# Patient Record
Sex: MALE | Race: WHITE | NOT HISPANIC OR LATINO
[De-identification: names, ages, dates, MRNs, and addresses within clinical notes are randomized per-mention and may not be internally consistent; named-entity substitution may affect disease eponyms.]

---

## 2020-09-25 ENCOUNTER — APPOINTMENT (OUTPATIENT)
Dept: DISASTER EMERGENCY | Facility: CLINIC | Age: 75
End: 2020-09-25

## 2020-09-25 DIAGNOSIS — Z01.818 ENCOUNTER FOR OTHER PREPROCEDURAL EXAMINATION: ICD-10-CM

## 2020-09-25 PROBLEM — Z00.00 ENCOUNTER FOR PREVENTIVE HEALTH EXAMINATION: Status: ACTIVE | Noted: 2020-09-25

## 2020-09-26 LAB — SARS-COV-2 N GENE NPH QL NAA+PROBE: NOT DETECTED

## 2021-01-25 ENCOUNTER — FORM ENCOUNTER (OUTPATIENT)
Age: 76
End: 2021-01-25

## 2021-01-26 ENCOUNTER — TRANSCRIPTION ENCOUNTER (OUTPATIENT)
Age: 76
End: 2021-01-26

## 2021-01-29 ENCOUNTER — OUTPATIENT (OUTPATIENT)
Dept: INPATIENT UNIT | Facility: HOSPITAL | Age: 76
LOS: 1 days | End: 2021-01-29
Payer: MEDICARE

## 2021-01-29 ENCOUNTER — APPOINTMENT (OUTPATIENT)
Dept: DISASTER EMERGENCY | Facility: HOSPITAL | Age: 76
End: 2021-01-29

## 2021-01-29 VITALS
WEIGHT: 169.98 LBS | RESPIRATION RATE: 18 BRPM | SYSTOLIC BLOOD PRESSURE: 167 MMHG | HEART RATE: 89 BPM | TEMPERATURE: 99 F | HEIGHT: 68 IN | DIASTOLIC BLOOD PRESSURE: 100 MMHG | OXYGEN SATURATION: 99 %

## 2021-01-29 VITALS
OXYGEN SATURATION: 99 % | DIASTOLIC BLOOD PRESSURE: 95 MMHG | RESPIRATION RATE: 18 BRPM | HEART RATE: 90 BPM | SYSTOLIC BLOOD PRESSURE: 152 MMHG | TEMPERATURE: 99 F

## 2021-01-29 DIAGNOSIS — U07.1 COVID-19: ICD-10-CM

## 2021-01-29 PROCEDURE — M0239: CPT

## 2021-01-29 RX ORDER — SODIUM CHLORIDE 9 MG/ML
250 INJECTION INTRAMUSCULAR; INTRAVENOUS; SUBCUTANEOUS
Refills: 0 | Status: DISCONTINUED | OUTPATIENT
Start: 2021-01-29 | End: 2021-02-13

## 2021-01-29 RX ORDER — BAMLANIVIMAB 35 MG/ML
700 INJECTION, SOLUTION INTRAVENOUS ONCE
Refills: 0 | Status: COMPLETED | OUTPATIENT
Start: 2021-01-29 | End: 2021-01-29

## 2021-01-29 RX ADMIN — BAMLANIVIMAB 270 MILLIGRAM(S): 35 INJECTION, SOLUTION INTRAVENOUS at 14:18

## 2021-01-29 RX ADMIN — SODIUM CHLORIDE 25 MILLILITER(S): 9 INJECTION INTRAMUSCULAR; INTRAVENOUS; SUBCUTANEOUS at 15:30

## 2021-01-29 NOTE — CHART NOTE - NSCHARTNOTEFT_GEN_A_CORE
CC: Monoclonal Antibody Infusion/COVID 19 Positive  75yMale with pmhx of CAD s/p CABG x 3, HTN, DM2, BPH, HLD, [place], presents today for monoclonal antibody infusion. Patient endorses onset of symptoms 1/26 consisting of chills & malaise, tested + for COVID the same day, and referred by PCP-Dr. Stauffer for infusion.     exam/findings:  T(C): 37.3 (01-29-21 @ 14:35), Max: 37.3 (01-29-21 @ 14:35)  HR: 83 (01-29-21 @ 14:35) (82 - 89)  BP: 151/88 (01-29-21 @ 14:35) (148/100 - 167/100)  RR: 16 (01-29-21 @ 14:35) (16 - 18)  SpO2: 97% (01-29-21 @ 14:35) (97% - 99%)      PE:   Appearance: NAD		  Skin: warm and dry  Neurologic: Non-focal  Extremities: Normal range of motion,    ASSESSMENT:  Pt is a 75yMale with pmhx of CAD s/p CABG x 3, HTN, DM2, BPH, HLD, [place], tested + for COVID the same day, and referred by PCP-Dr. Stauffer to infusion center for Monoclonal antibody infusion (Bamlanivimab).  Symptoms/ Criteria: Chills & malaise  Risk Profile includes: Age>65, cardiovascular hx, hx of DM2    PLAN:  - Infusion procedure explained to patient   - Consent for monoclonal antibody infusion obtained   - Risk & benefits discussed/all questions answered  - Infuse Bamlanivimab 700mg IV over one hour   - Observe patient for one hour post infusion    I have reviewed the Bamlanivimab Emergency Use Authorization (EUA) and I have provided the patient or patient's caregiver with the following information:      1. FDA has authorized emergency use Bamlanivimab, which is not an FDA-approved biological product.  2. The patient or patient's caregiver has the option to accept or refuse administration of Bamlanivimab.   3. The significant known and potential risks and benefits of Bamlanivimab and the extent to which such risks and benefits are unknown.  4. Information on available alternative treatments and risks and benefits of those alternatives.

## 2021-01-30 ENCOUNTER — TRANSCRIPTION ENCOUNTER (OUTPATIENT)
Age: 76
End: 2021-01-30

## 2021-03-06 ENCOUNTER — TRANSCRIPTION ENCOUNTER (OUTPATIENT)
Age: 76
End: 2021-03-06

## 2021-07-20 ENCOUNTER — OUTPATIENT (OUTPATIENT)
Dept: OUTPATIENT SERVICES | Facility: HOSPITAL | Age: 76
LOS: 1 days | Discharge: HOME | End: 2021-07-20
Payer: MEDICARE

## 2021-07-20 VITALS
OXYGEN SATURATION: 100 % | DIASTOLIC BLOOD PRESSURE: 95 MMHG | SYSTOLIC BLOOD PRESSURE: 155 MMHG | WEIGHT: 189.6 LBS | HEART RATE: 80 BPM | RESPIRATION RATE: 16 BRPM | HEIGHT: 68 IN | TEMPERATURE: 98 F

## 2021-07-20 DIAGNOSIS — Z98.890 OTHER SPECIFIED POSTPROCEDURAL STATES: Chronic | ICD-10-CM

## 2021-07-20 DIAGNOSIS — M62.830 MUSCLE SPASM OF BACK: ICD-10-CM

## 2021-07-20 DIAGNOSIS — Z95.1 PRESENCE OF AORTOCORONARY BYPASS GRAFT: Chronic | ICD-10-CM

## 2021-07-20 DIAGNOSIS — Z01.818 ENCOUNTER FOR OTHER PREPROCEDURAL EXAMINATION: ICD-10-CM

## 2021-07-20 LAB
A1C WITH ESTIMATED AVERAGE GLUCOSE RESULT: 7.2 % — HIGH (ref 4–5.6)
ANION GAP SERPL CALC-SCNC: 4 MMOL/L — LOW (ref 5–17)
APTT BLD: 29.6 SEC — SIGNIFICANT CHANGE UP (ref 27.5–35.5)
BLD GP AB SCN SERPL QL: SIGNIFICANT CHANGE UP
BUN SERPL-MCNC: 47 MG/DL — HIGH (ref 7–23)
CALCIUM SERPL-MCNC: 9.9 MG/DL — SIGNIFICANT CHANGE UP (ref 8.5–10.1)
CHLORIDE SERPL-SCNC: 109 MMOL/L — HIGH (ref 96–108)
CO2 SERPL-SCNC: 29 MMOL/L — SIGNIFICANT CHANGE UP (ref 22–31)
CREAT SERPL-MCNC: 1.76 MG/DL — HIGH (ref 0.5–1.3)
ESTIMATED AVERAGE GLUCOSE: 160 MG/DL — HIGH (ref 68–114)
GLUCOSE SERPL-MCNC: 135 MG/DL — HIGH (ref 70–99)
HCT VFR BLD CALC: 39.9 % — SIGNIFICANT CHANGE UP (ref 39–50)
HGB BLD-MCNC: 12.5 G/DL — LOW (ref 13–17)
INR BLD: 0.87 RATIO — LOW (ref 0.88–1.16)
MCHC RBC-ENTMCNC: 26.9 PG — LOW (ref 27–34)
MCHC RBC-ENTMCNC: 31.3 GM/DL — LOW (ref 32–36)
MCV RBC AUTO: 85.8 FL — SIGNIFICANT CHANGE UP (ref 80–100)
NRBC # BLD: 0 /100 WBCS — SIGNIFICANT CHANGE UP (ref 0–0)
PLATELET # BLD AUTO: 226 K/UL — SIGNIFICANT CHANGE UP (ref 150–400)
POTASSIUM SERPL-MCNC: 5 MMOL/L — SIGNIFICANT CHANGE UP (ref 3.5–5.3)
POTASSIUM SERPL-SCNC: 5 MMOL/L — SIGNIFICANT CHANGE UP (ref 3.5–5.3)
PROTHROM AB SERPL-ACNC: 10.2 SEC — LOW (ref 10.6–13.6)
RBC # BLD: 4.65 M/UL — SIGNIFICANT CHANGE UP (ref 4.2–5.8)
RBC # FLD: 15.1 % — HIGH (ref 10.3–14.5)
SODIUM SERPL-SCNC: 142 MMOL/L — SIGNIFICANT CHANGE UP (ref 135–145)
WBC # BLD: 7.52 K/UL — SIGNIFICANT CHANGE UP (ref 3.8–10.5)
WBC # FLD AUTO: 7.52 K/UL — SIGNIFICANT CHANGE UP (ref 3.8–10.5)

## 2021-07-20 PROCEDURE — 93010 ELECTROCARDIOGRAM REPORT: CPT

## 2021-07-20 NOTE — H&P PST ADULT - NSANTHOSAYNRD_GEN_A_CORE
No. JEANCARLOS screening performed.  STOP BANG Legend: 0-2 = LOW Risk; 3-4 = INTERMEDIATE Risk; 5-8 = HIGH Risk

## 2021-07-20 NOTE — H&P PST ADULT - NSICDXPASTSURGICALHX_GEN_ALL_CORE_FT
PAST SURGICAL HISTORY:  H/O elbow surgery right    H/O hernia repair x2    History of surgery on arm right humerous    History of transurethral prostatectomy     S/P CABG x 3 2015

## 2021-07-20 NOTE — H&P PST ADULT - ASSESSMENT
76 year old male with a past medical history of HTN, CABGx3, DM, HLD, GERD c/o lower back pain and b/l buttock pain that radiates down left and right leg (right leg daily, left intermittent)  with intermittent numbness and tingling to right foot.  He is scheduled for a decompression laminectomy L4-L5 on 2021.    CAPRINI SCORE [CLOT]    AGE RELATED RISK FACTORS                                                       MOBILITY RELATED FACTORS  [ ] Age 41-60 years                                            (1 Point)                  [ ] Bed rest                                                        (1 Point)  [ ] Age: 61-74 years                                           (2 Points)                 [ ] Plaster cast                                                   (2 Points)  [x ] Age= 75 years                                              (3 Points)                 [ ] Bed bound for more than 72 hours                 (2 Points)    DISEASE RELATED RISK FACTORS                                               GENDER SPECIFIC FACTORS  [ ] Edema in the lower extremities                       (1 Point)                  [ ] Pregnancy                                                     (1 Point)  [ ] Varicose veins                                               (1 Point)                  [ ] Post-partum < 6 weeks                                   (1 Point)             [ x BMI > 25 Kg/m2                                            (1 Point)                  [ ] Hormonal therapy  or oral contraception          (1 Point)                 [ ] Sepsis (in the previous month)                        (1 Point)                  [ ] History of pregnancy complications                 (1 point)  [ ] Pneumonia or serious lung disease                                               [ ] Unexplained or recurrent                     (1 Point)           (in the previous month)                               (1 Point)  [ ] Abnormal pulmonary function test                     (1 Point)                 SURGERY RELATED RISK FACTORS  [ ] Acute myocardial infarction                              (1 Point)                 [ ]  Section                                             (1 Point)  [ ] Congestive heart failure (in the previous month)  (1 Point)               [ ] Minor surgery                                                  (1 Point)   [ ] Inflammatory bowel disease                             (1 Point)                 [ ] Arthroscopic surgery                                        (2 Points)  [ ] Central venous access                                      (2 Points)                [x ] General surgery lasting more than 45 minutes   (2 Points)       [ ] Stroke (in the previous month)                          (5 Points)               [ ] Elective arthroplasty                                         (5 Points)                                                                                                                                               HEMATOLOGY RELATED FACTORS                                                 TRAUMA RELATED RISK FACTORS  [ ] Prior episodes of VTE                                     (3 Points)                [ ] Fracture of the hip, pelvis, or leg                       (5 Points)  [ ] Positive family history for VTE                         (3 Points)                 [ ] Acute spinal cord injury (in the previous month)  (5 Points)  [ ] Prothrombin 38734 A                                     (3 Points)                 [ ] Paralysis  (less than 1 month)                             (5 Points)  [ ] Factor V Leiden                                             (3 Points)                  [ ] Multiple Trauma within 1 month                        (5 Points)  [ ] Lupus anticoagulants                                     (3 Points)                                                           [ ] Anticardiolipin antibodies                               (3 Points)                                                       [ ] High homocysteine in the blood                      (3 Points)                                             [ ] Other congenital or acquired thrombophilia      (3 Points)                                                [ ] Heparin induced thrombocytopenia                  (3 Points)                                          Total Score [  6        ]    Caprini Score 0 - 2:  Low Risk, No VTE Prophylaxis required for most patients, encourage ambulation  Caprini Score 3 - 6:  At Risk, pharmacologic VTE prophylaxis is indicated for most patients (in the absence of a contraindication)  Caprini Score Greater than or = 7:  High Risk, pharmacologic VTE prophylaxis is indicated for most patients (in the absence of a contraindication)    Caprini score indicates that the patient is high risk for VTE event ( score 6 or greater). Surgical patient's in this group will benefit from both pharmacologic prophylaxis and intermittent compression devices . Surgical team will determine the balance between VTE  risk and bleeding risk and other clinical considerations

## 2021-07-20 NOTE — H&P PST ADULT - ATTENDING COMMENTS
indicated for lumbar decompression - r/b/e questions answered - well informed and would like to proceed

## 2021-07-20 NOTE — H&P PST ADULT - HISTORY OF PRESENT ILLNESS
76 year old male with a past medical history of HTN, CABGx3, DM, HLD, GERD c/o lower back pain and  b/l buttock pain that radiates down left and right leg (right leg daily, left intermittent)  with intermittent numbness and tingling to right foot.  He is scheduled for a decompression laminectomy L4-L5 on 7/27/2021.    He denies fever, cough, SOB, recent travels, and sick contacts.

## 2021-07-20 NOTE — H&P PST ADULT - NSICDXPROBLEM_GEN_ALL_CORE_FT
PROBLEM DIAGNOSES  Problem: Muscle spasm of back  Assessment and Plan: Decompression laminectomy L4-L5 with image

## 2021-07-20 NOTE — H&P PST ADULT - NSICDXPASTMEDICALHX_GEN_ALL_CORE_FT
PAST MEDICAL HISTORY:  DM (diabetes mellitus)     GERD (gastroesophageal reflux disease)     HLD (hyperlipidemia)     HTN (hypertension)     S/P CABG x 3 2015

## 2021-07-26 ENCOUNTER — TRANSCRIPTION ENCOUNTER (OUTPATIENT)
Age: 76
End: 2021-07-26

## 2021-07-27 ENCOUNTER — TRANSCRIPTION ENCOUNTER (OUTPATIENT)
Age: 76
End: 2021-07-27

## 2021-07-27 ENCOUNTER — INPATIENT (INPATIENT)
Facility: HOSPITAL | Age: 76
LOS: 5 days | Discharge: NURSING, REHAB OR LONG-TERM CARE | End: 2021-08-02
Attending: ORTHOPAEDIC SURGERY | Admitting: ORTHOPAEDIC SURGERY
Payer: MEDICARE

## 2021-07-27 VITALS
WEIGHT: 184.97 LBS | TEMPERATURE: 99 F | DIASTOLIC BLOOD PRESSURE: 74 MMHG | OXYGEN SATURATION: 100 % | SYSTOLIC BLOOD PRESSURE: 145 MMHG | HEART RATE: 55 BPM | HEIGHT: 68 IN | RESPIRATION RATE: 17 BRPM

## 2021-07-27 DIAGNOSIS — Z98.890 OTHER SPECIFIED POSTPROCEDURAL STATES: Chronic | ICD-10-CM

## 2021-07-27 DIAGNOSIS — Z95.1 PRESENCE OF AORTOCORONARY BYPASS GRAFT: Chronic | ICD-10-CM

## 2021-07-27 LAB
ANION GAP SERPL CALC-SCNC: 10 MMOL/L — SIGNIFICANT CHANGE UP (ref 5–17)
BASOPHILS # BLD AUTO: 0.02 K/UL — SIGNIFICANT CHANGE UP (ref 0–0.2)
BASOPHILS NFR BLD AUTO: 0.3 % — SIGNIFICANT CHANGE UP (ref 0–2)
BLD GP AB SCN SERPL QL: SIGNIFICANT CHANGE UP
BUN SERPL-MCNC: 43 MG/DL — HIGH (ref 7–23)
CALCIUM SERPL-MCNC: 8.5 MG/DL — SIGNIFICANT CHANGE UP (ref 8.5–10.1)
CHLORIDE SERPL-SCNC: 110 MMOL/L — HIGH (ref 96–108)
CO2 SERPL-SCNC: 22 MMOL/L — SIGNIFICANT CHANGE UP (ref 22–31)
CREAT SERPL-MCNC: 1.94 MG/DL — HIGH (ref 0.5–1.3)
EOSINOPHIL # BLD AUTO: 0.16 K/UL — SIGNIFICANT CHANGE UP (ref 0–0.5)
EOSINOPHIL NFR BLD AUTO: 2.4 % — SIGNIFICANT CHANGE UP (ref 0–6)
GLUCOSE BLDC GLUCOMTR-MCNC: 129 MG/DL — HIGH (ref 70–99)
GLUCOSE BLDC GLUCOMTR-MCNC: 144 MG/DL — HIGH (ref 70–99)
GLUCOSE BLDC GLUCOMTR-MCNC: 172 MG/DL — HIGH (ref 70–99)
GLUCOSE BLDC GLUCOMTR-MCNC: 183 MG/DL — HIGH (ref 70–99)
GLUCOSE BLDC GLUCOMTR-MCNC: 185 MG/DL — HIGH (ref 70–99)
GLUCOSE SERPL-MCNC: 184 MG/DL — HIGH (ref 70–99)
HCT VFR BLD CALC: 33.9 % — LOW (ref 39–50)
HGB BLD-MCNC: 10.9 G/DL — LOW (ref 13–17)
IMM GRANULOCYTES NFR BLD AUTO: 0.3 % — SIGNIFICANT CHANGE UP (ref 0–1.5)
LYMPHOCYTES # BLD AUTO: 2.34 K/UL — SIGNIFICANT CHANGE UP (ref 1–3.3)
LYMPHOCYTES # BLD AUTO: 35.3 % — SIGNIFICANT CHANGE UP (ref 13–44)
MCHC RBC-ENTMCNC: 27.4 PG — SIGNIFICANT CHANGE UP (ref 27–34)
MCHC RBC-ENTMCNC: 32.2 GM/DL — SIGNIFICANT CHANGE UP (ref 32–36)
MCV RBC AUTO: 85.2 FL — SIGNIFICANT CHANGE UP (ref 80–100)
MONOCYTES # BLD AUTO: 0.6 K/UL — SIGNIFICANT CHANGE UP (ref 0–0.9)
MONOCYTES NFR BLD AUTO: 9.1 % — SIGNIFICANT CHANGE UP (ref 2–14)
NEUTROPHILS # BLD AUTO: 3.48 K/UL — SIGNIFICANT CHANGE UP (ref 1.8–7.4)
NEUTROPHILS NFR BLD AUTO: 52.6 % — SIGNIFICANT CHANGE UP (ref 43–77)
NRBC # BLD: 0 /100 WBCS — SIGNIFICANT CHANGE UP (ref 0–0)
PLATELET # BLD AUTO: 152 K/UL — SIGNIFICANT CHANGE UP (ref 150–400)
POTASSIUM SERPL-MCNC: 4.5 MMOL/L — SIGNIFICANT CHANGE UP (ref 3.5–5.3)
POTASSIUM SERPL-SCNC: 4.5 MMOL/L — SIGNIFICANT CHANGE UP (ref 3.5–5.3)
RBC # BLD: 3.98 M/UL — LOW (ref 4.2–5.8)
RBC # FLD: 14.7 % — HIGH (ref 10.3–14.5)
SODIUM SERPL-SCNC: 142 MMOL/L — SIGNIFICANT CHANGE UP (ref 135–145)
WBC # BLD: 6.62 K/UL — SIGNIFICANT CHANGE UP (ref 3.8–10.5)
WBC # FLD AUTO: 6.62 K/UL — SIGNIFICANT CHANGE UP (ref 3.8–10.5)

## 2021-07-27 RX ORDER — SODIUM CHLORIDE 9 MG/ML
1000 INJECTION, SOLUTION INTRAVENOUS
Refills: 0 | Status: DISCONTINUED | OUTPATIENT
Start: 2021-07-27 | End: 2021-07-27

## 2021-07-27 RX ORDER — DEXTROSE 50 % IN WATER 50 %
25 SYRINGE (ML) INTRAVENOUS ONCE
Refills: 0 | Status: DISCONTINUED | OUTPATIENT
Start: 2021-07-27 | End: 2021-08-02

## 2021-07-27 RX ORDER — DEXTROSE 50 % IN WATER 50 %
15 SYRINGE (ML) INTRAVENOUS ONCE
Refills: 0 | Status: DISCONTINUED | OUTPATIENT
Start: 2021-07-27 | End: 2021-08-02

## 2021-07-27 RX ORDER — ACETAMINOPHEN 500 MG
975 TABLET ORAL EVERY 8 HOURS
Refills: 0 | Status: DISCONTINUED | OUTPATIENT
Start: 2021-07-27 | End: 2021-08-02

## 2021-07-27 RX ORDER — GLUCAGON INJECTION, SOLUTION 0.5 MG/.1ML
1 INJECTION, SOLUTION SUBCUTANEOUS ONCE
Refills: 0 | Status: DISCONTINUED | OUTPATIENT
Start: 2021-07-27 | End: 2021-08-02

## 2021-07-27 RX ORDER — LISINOPRIL 2.5 MG/1
10 TABLET ORAL DAILY
Refills: 0 | Status: DISCONTINUED | OUTPATIENT
Start: 2021-07-27 | End: 2021-08-02

## 2021-07-27 RX ORDER — CEFAZOLIN SODIUM 1 G
2000 VIAL (EA) INJECTION EVERY 8 HOURS
Refills: 0 | Status: COMPLETED | OUTPATIENT
Start: 2021-07-27 | End: 2021-07-28

## 2021-07-27 RX ORDER — ONDANSETRON 8 MG/1
4 TABLET, FILM COATED ORAL ONCE
Refills: 0 | Status: DISCONTINUED | OUTPATIENT
Start: 2021-07-27 | End: 2021-07-27

## 2021-07-27 RX ORDER — QUINAPRIL HYDROCHLORIDE 40 MG/1
0 TABLET, FILM COATED ORAL
Qty: 0 | Refills: 0 | DISCHARGE

## 2021-07-27 RX ORDER — DEXTROSE 50 % IN WATER 50 %
12.5 SYRINGE (ML) INTRAVENOUS ONCE
Refills: 0 | Status: DISCONTINUED | OUTPATIENT
Start: 2021-07-27 | End: 2021-08-02

## 2021-07-27 RX ORDER — ASPIRIN/CALCIUM CARB/MAGNESIUM 324 MG
81 TABLET ORAL DAILY
Refills: 0 | Status: DISCONTINUED | OUTPATIENT
Start: 2021-07-29 | End: 2021-08-02

## 2021-07-27 RX ORDER — SENNA PLUS 8.6 MG/1
2 TABLET ORAL AT BEDTIME
Refills: 0 | Status: DISCONTINUED | OUTPATIENT
Start: 2021-07-27 | End: 2021-08-02

## 2021-07-27 RX ORDER — SODIUM CHLORIDE 9 MG/ML
500 INJECTION INTRAMUSCULAR; INTRAVENOUS; SUBCUTANEOUS ONCE
Refills: 0 | Status: COMPLETED | OUTPATIENT
Start: 2021-07-27 | End: 2021-07-27

## 2021-07-27 RX ORDER — METOCLOPRAMIDE HCL 10 MG
10 TABLET ORAL ONCE
Refills: 0 | Status: DISCONTINUED | OUTPATIENT
Start: 2021-07-27 | End: 2021-07-29

## 2021-07-27 RX ORDER — INSULIN LISPRO 100/ML
VIAL (ML) SUBCUTANEOUS
Refills: 0 | Status: DISCONTINUED | OUTPATIENT
Start: 2021-07-27 | End: 2021-08-02

## 2021-07-27 RX ORDER — SODIUM CHLORIDE 9 MG/ML
1000 INJECTION, SOLUTION INTRAVENOUS
Refills: 0 | Status: DISCONTINUED | OUTPATIENT
Start: 2021-07-27 | End: 2021-08-02

## 2021-07-27 RX ORDER — PANTOPRAZOLE SODIUM 20 MG/1
0 TABLET, DELAYED RELEASE ORAL
Qty: 0 | Refills: 0 | DISCHARGE

## 2021-07-27 RX ORDER — SODIUM CHLORIDE 9 MG/ML
3 INJECTION INTRAMUSCULAR; INTRAVENOUS; SUBCUTANEOUS EVERY 8 HOURS
Refills: 0 | Status: DISCONTINUED | OUTPATIENT
Start: 2021-07-27 | End: 2021-07-27

## 2021-07-27 RX ORDER — CYCLOBENZAPRINE HYDROCHLORIDE 10 MG/1
10 TABLET, FILM COATED ORAL EVERY 8 HOURS
Refills: 0 | Status: DISCONTINUED | OUTPATIENT
Start: 2021-07-27 | End: 2021-07-28

## 2021-07-27 RX ORDER — METOPROLOL TARTRATE 50 MG
25 TABLET ORAL DAILY
Refills: 0 | Status: DISCONTINUED | OUTPATIENT
Start: 2021-07-27 | End: 2021-08-02

## 2021-07-27 RX ORDER — GLIMEPIRIDE 1 MG
1 TABLET ORAL
Qty: 0 | Refills: 0 | DISCHARGE

## 2021-07-27 RX ORDER — PANTOPRAZOLE SODIUM 20 MG/1
40 TABLET, DELAYED RELEASE ORAL
Refills: 0 | Status: DISCONTINUED | OUTPATIENT
Start: 2021-07-27 | End: 2021-07-31

## 2021-07-27 RX ORDER — SIMVASTATIN 20 MG/1
1 TABLET, FILM COATED ORAL
Qty: 0 | Refills: 0 | DISCHARGE

## 2021-07-27 RX ORDER — SIMVASTATIN 20 MG/1
40 TABLET, FILM COATED ORAL AT BEDTIME
Refills: 0 | Status: DISCONTINUED | OUTPATIENT
Start: 2021-07-27 | End: 2021-08-02

## 2021-07-27 RX ORDER — OXYCODONE HYDROCHLORIDE 5 MG/1
10 TABLET ORAL EVERY 4 HOURS
Refills: 0 | Status: DISCONTINUED | OUTPATIENT
Start: 2021-07-27 | End: 2021-08-02

## 2021-07-27 RX ORDER — SODIUM CHLORIDE 9 MG/ML
1000 INJECTION, SOLUTION INTRAVENOUS
Refills: 0 | Status: DISCONTINUED | OUTPATIENT
Start: 2021-07-27 | End: 2021-07-30

## 2021-07-27 RX ORDER — OXYCODONE HYDROCHLORIDE 5 MG/1
5 TABLET ORAL EVERY 4 HOURS
Refills: 0 | Status: DISCONTINUED | OUTPATIENT
Start: 2021-07-27 | End: 2021-08-02

## 2021-07-27 RX ORDER — FENTANYL CITRATE 50 UG/ML
25 INJECTION INTRAVENOUS
Refills: 0 | Status: DISCONTINUED | OUTPATIENT
Start: 2021-07-27 | End: 2021-07-27

## 2021-07-27 RX ORDER — METOPROLOL TARTRATE 50 MG
1 TABLET ORAL
Qty: 0 | Refills: 0 | DISCHARGE

## 2021-07-27 RX ORDER — HYDROMORPHONE HYDROCHLORIDE 2 MG/ML
0.5 INJECTION INTRAMUSCULAR; INTRAVENOUS; SUBCUTANEOUS
Refills: 0 | Status: DISCONTINUED | OUTPATIENT
Start: 2021-07-27 | End: 2021-08-02

## 2021-07-27 RX ORDER — DIPHENHYDRAMINE HCL 50 MG
12.5 CAPSULE ORAL EVERY 4 HOURS
Refills: 0 | Status: DISCONTINUED | OUTPATIENT
Start: 2021-07-27 | End: 2021-08-02

## 2021-07-27 RX ADMIN — Medication 975 MILLIGRAM(S): at 14:50

## 2021-07-27 RX ADMIN — SODIUM CHLORIDE 1000 MILLILITER(S): 9 INJECTION INTRAMUSCULAR; INTRAVENOUS; SUBCUTANEOUS at 16:16

## 2021-07-27 RX ADMIN — Medication 975 MILLIGRAM(S): at 22:35

## 2021-07-27 RX ADMIN — FENTANYL CITRATE 25 MICROGRAM(S): 50 INJECTION INTRAVENOUS at 12:21

## 2021-07-27 RX ADMIN — HYDROMORPHONE HYDROCHLORIDE 0.5 MILLIGRAM(S): 2 INJECTION INTRAMUSCULAR; INTRAVENOUS; SUBCUTANEOUS at 20:24

## 2021-07-27 RX ADMIN — SODIUM CHLORIDE 75 MILLILITER(S): 9 INJECTION, SOLUTION INTRAVENOUS at 13:11

## 2021-07-27 RX ADMIN — CYCLOBENZAPRINE HYDROCHLORIDE 10 MILLIGRAM(S): 10 TABLET, FILM COATED ORAL at 21:35

## 2021-07-27 RX ADMIN — Medication 975 MILLIGRAM(S): at 13:58

## 2021-07-27 RX ADMIN — OXYCODONE HYDROCHLORIDE 10 MILLIGRAM(S): 5 TABLET ORAL at 19:24

## 2021-07-27 RX ADMIN — SIMVASTATIN 40 MILLIGRAM(S): 20 TABLET, FILM COATED ORAL at 21:35

## 2021-07-27 RX ADMIN — OXYCODONE HYDROCHLORIDE 10 MILLIGRAM(S): 5 TABLET ORAL at 13:57

## 2021-07-27 RX ADMIN — Medication 975 MILLIGRAM(S): at 21:35

## 2021-07-27 RX ADMIN — Medication 100 MILLIGRAM(S): at 17:10

## 2021-07-27 RX ADMIN — OXYCODONE HYDROCHLORIDE 10 MILLIGRAM(S): 5 TABLET ORAL at 14:50

## 2021-07-27 RX ADMIN — CYCLOBENZAPRINE HYDROCHLORIDE 10 MILLIGRAM(S): 10 TABLET, FILM COATED ORAL at 13:58

## 2021-07-27 RX ADMIN — OXYCODONE HYDROCHLORIDE 10 MILLIGRAM(S): 5 TABLET ORAL at 19:39

## 2021-07-27 RX ADMIN — SODIUM CHLORIDE 75 MILLILITER(S): 9 INJECTION, SOLUTION INTRAVENOUS at 11:54

## 2021-07-27 RX ADMIN — HYDROMORPHONE HYDROCHLORIDE 0.5 MILLIGRAM(S): 2 INJECTION INTRAMUSCULAR; INTRAVENOUS; SUBCUTANEOUS at 20:25

## 2021-07-27 RX ADMIN — SENNA PLUS 2 TABLET(S): 8.6 TABLET ORAL at 21:35

## 2021-07-27 RX ADMIN — FENTANYL CITRATE 25 MICROGRAM(S): 50 INJECTION INTRAVENOUS at 12:06

## 2021-07-27 RX ADMIN — SODIUM CHLORIDE 75 MILLILITER(S): 9 INJECTION, SOLUTION INTRAVENOUS at 21:36

## 2021-07-27 NOTE — DISCHARGE NOTE PROVIDER - NSDCFUADDINST_GEN_ALL_CORE_FT
No bending/lifting/twisting/pulling/pushing/carrying or driving. No blood thinners (not limited to but including) aspirin, motrin, alleve, naproxen, etc.    Keep Dressing Clean, Dry and Intact. May shower on POD#5 with Dressing on. Dressing may be removed on POD#7. Please do not scrub, soak, peel or pick at the dressing. No creams, lotions, or oils over dressing. May shower on POD#5 and let water run over dressing, no baths. Pat dry once out of shower.     If dressing is saturated from border to border. Remove dressing and cover with clean dry dressing.  No straining or bending/lifting/twisting/pulling/pushing/carrying or driving. No blood thinners (not limited to but including) aspirin, motrin, alleve, naproxen, etc.    Keep Dressing Clean, Dry and Intact. May shower on POD#5 with Dressing on. Dressing may be removed on POD#7. Please do not scrub, soak, peel or pick at the dressing. No creams, lotions, or oils over dressing. May shower on POD#5 and let water run over dressing, no baths. Pat dry once out of shower.     If dressing is saturated from border to border. Remove dressing and cover with clean dry dressing.

## 2021-07-27 NOTE — DISCHARGE NOTE PROVIDER - CARE PROVIDER_API CALL
Anthony Chandler)  Orthopaedic Surgery  [address] [place] [address]  Phone: [phone]  Fax: [phone]  Follow Up Time:

## 2021-07-27 NOTE — ASU PATIENT PROFILE, ADULT - PSH
H/O elbow surgery  right  H/O hernia repair  x2  History of surgery on arm  right humerous  History of transurethral prostatectomy    S/P CABG x 3  2015

## 2021-07-27 NOTE — DISCHARGE NOTE PROVIDER - NSDCMRMEDTOKEN_GEN_ALL_CORE_FT
Aspirin Enteric Coated 81 mg oral delayed release tablet: 1 tab(s) orally once a day  CoQ10: 50 milligram(s) orally once a day  folic acid 0.4 mg oral tablet: 1 tab(s) orally once a day  glimepiride 4 mg oral tablet: 1  orally 2 times a day  Januvia 100 mg oral tablet: 1 tab(s) orally once a day  metFORMIN 1000 mg oral tablet: 1 tab(s) orally 2 times a day  Metoprolol Succinate ER 25 mg oral tablet, extended release: 1 tab(s) orally once a day  Omega-3 1000 mg oral capsule: 1 cap(s) orally 2 times a day  pantoprazole 40 mg oral delayed release tablet: orally every other day  quinapril 10 mg oral tablet: orally 2 times a day  simvastatin 40 mg oral tablet: 1 tab(s) orally once a day (at bedtime)   acetaminophen 325 mg oral tablet: 3 tab(s) orally every 8 hours  aspirin 81 mg oral delayed release tablet: 1 tab(s) orally once a day  glimepiride 4 mg oral tablet: 1  orally 2 times a day  Metoprolol Succinate ER 25 mg oral tablet, extended release: 1 tab(s) orally once a day  oxyCODONE 10 mg oral tablet: 1 tab(s) orally every 4 hours, As needed, Severe Pain (7 - 10)  oxyCODONE 5 mg oral tablet: 1 tab(s) orally every 4 hours, As needed, Moderate Pain (4 - 6)  pantoprazole 40 mg oral delayed release tablet: orally every other day  quinapril 10 mg oral tablet: orally 2 times a day  senna oral tablet: 2 tab(s) orally once a day (at bedtime)  simvastatin 40 mg oral tablet: 1 tab(s) orally once a day (at bedtime)

## 2021-07-27 NOTE — PROGRESS NOTE ADULT - SUBJECTIVE AND OBJECTIVE BOX
76yMale s/p Laminectomy/discectomy POD #0  Pt seen and examined in NAD.   Pain controlled.  Pt denies any new complaints.   Pt denies CP/SOB/N/V/D/numbness/tingling/bowel or bladder dysfunction.     Vital Signs Last 24 Hrs  T(C): 36.4 (27 Jul 2021 13:00), Max: 37 (27 Jul 2021 07:28)  T(F): 97.5 (27 Jul 2021 13:00), Max: 98.6 (27 Jul 2021 07:28)  HR: 61 (27 Jul 2021 13:00) (55 - 76)  BP: 151/85 (27 Jul 2021 13:00) (127/81 - 151/85)  BP(mean): 111 (27 Jul 2021 12:26) (96 - 111)  RR: 15 (27 Jul 2021 13:00) (15 - 20)  SpO2: 99% (27 Jul 2021 13:00) (96% - 100%)    PE:   General: A&Ox3, NAD  Spine: Dressing c/d/i   B/L UE: Skin intact. +ROM shoulder/elbow/wrist/fingers. +ok/thumbsup/fingercross signs.  strength: 5/5.  RP2+ NVI.   B/L LE: Skin intact. +ROM hip/knee/ankle/toes. Ankle Dorsi/plantarflexion: 5/5. Calf: soft, compressible and nontender. DP/PT 2+ NVI.                             10.9   6.62  )-----------( 152      ( 27 Jul 2021 12:22 )             33.9       07-27    142  |  110<H>  |  43<H>  ----------------------------<  184<H>  4.5   |  22  |  1.94<H>    Ca    8.5      27 Jul 2021 12:22          A/P: 76yMale s/p Laminectomy/discectomy POD #0  Intra op spinal fluid leak  Monitor headaches  bedrest until tomorrow AM  HOB elevated for meals  Wound care  Pain controlled  PT: WBAT: Spinal precautions  Isometric exercises  DVT ppx: SCDs  Incentive spirometry counseled   Discharge planning  All the above discussed and understood by pt

## 2021-07-27 NOTE — ASU PATIENT PROFILE, ADULT - PMH
DM (diabetes mellitus)    GERD (gastroesophageal reflux disease)    HLD (hyperlipidemia)    HTN (hypertension)    S/P CABG x 3  2015

## 2021-07-27 NOTE — CONSULT NOTE ADULT - SUBJECTIVE AND OBJECTIVE BOX
SONIYA STALLWORTH is a 76y Male s/p DECOMPRESSION LAMINECTOMY L4-5 WITH IMAGE    EXTENSIVE SURGERY      w/ h/o S/P CABG x 3    HTN (hypertension)    HLD (hyperlipidemia)    GERD (gastroesophageal reflux disease)    DM (diabetes mellitus)      denies any chest pain shortness of breath palpitation dizziness lightheadedness nausea vomiting fever or chills    S/P CABG x 3    H/O hernia repair    History of transurethral prostatectomy    History of surgery on arm    H/O elbow surgery        SH: doesnot smoke or drink at this time    No Known Allergies    acetaminophen   Tablet .. 975 milliGRAM(s) Oral every 8 hours  ceFAZolin   IVPB 2000 milliGRAM(s) IV Intermittent every 8 hours  cyclobenzaprine 10 milliGRAM(s) Oral every 8 hours  dextrose 40% Gel 15 Gram(s) Oral once  dextrose 5%. 1000 milliLiter(s) IV Continuous <Continuous>  dextrose 5%. 1000 milliLiter(s) IV Continuous <Continuous>  dextrose 50% Injectable 25 Gram(s) IV Push once  dextrose 50% Injectable 12.5 Gram(s) IV Push once  dextrose 50% Injectable 25 Gram(s) IV Push once  diphenhydrAMINE   Injectable 12.5 milliGRAM(s) IV Push every 4 hours PRN  glucagon  Injectable 1 milliGRAM(s) IntraMuscular once  HYDROmorphone  Injectable 0.5 milliGRAM(s) IV Push every 3 hours PRN  insulin lispro (ADMELOG) corrective regimen sliding scale   SubCutaneous three times a day before meals  lactated ringers. 1000 milliLiter(s) IV Continuous <Continuous>  lisinopril 10 milliGRAM(s) Oral daily  metoclopramide Injectable 10 milliGRAM(s) IV Push once PRN  metoprolol succinate ER 25 milliGRAM(s) Oral daily  oxyCODONE    IR 5 milliGRAM(s) Oral every 4 hours PRN  oxyCODONE    IR 10 milliGRAM(s) Oral every 4 hours PRN  pantoprazole    Tablet 40 milliGRAM(s) Oral before breakfast  senna 2 Tablet(s) Oral at bedtime  simvastatin 40 milliGRAM(s) Oral at bedtime    T(C): 36.9 (07-27-21 @ 19:57), Max: 37 (07-27-21 @ 07:28)  HR: 62 (07-27-21 @ 19:57) (55 - 76)  BP: 146/84 (07-27-21 @ 19:57) (115/65 - 156/78)  RR: 15 (07-27-21 @ 19:57) (15 - 20)  SpO2: 99% (07-27-21 @ 19:57) (96% - 100%)  HEENT unremarkable  neck no JVD or bruit  heart normal S1 S2 RRR no gallops or rubs  chest clear to auscultation  abd sof nontender non distended +bs  ext no calf tenderness    A/P   DVT PX  pain control  bowel regimen   wound care as per ortho  GI PX  antiemetics prn  incentive spirometer

## 2021-07-27 NOTE — DISCHARGE NOTE PROVIDER - NSDCFUADDAPPT_GEN_ALL_CORE_FT
Follow up with your surgeon in two weeks. Call for appointment.  If you need more pain medication, call your surgeon's office. For medication refills or authorizations, please call [phone]  We recommend that you call and schedule a follow up appointment within 2-4 weeks with your primary care physician for repeat blood work (CBC and BMP) for post hospital discharge follow-up care.  Call your surgeon if you have increased redness/pain/drainage or fever. Return to ER for shortness of breath/calf tenderness.

## 2021-07-27 NOTE — DISCHARGE NOTE PROVIDER - HOSPITAL COURSE
76yMale with history of Lumbar radiculopathy presenting for L4-5 laminectomy by Dr. Chandler on 7/27/21. Risk and benefits of surgery were explained to the patient. The patient understood and agreed to proceed with surgery. Patient underwent the procedure with no intraoperative complications. Pt was brought in stable condition to the PACU. Once stable in PACU, pt was brought to the floor. Patient was on continuous cardiac monitor for h/o CABG x3.  During hospital stay pt was followed by Medicine, physical therapy, Home Care during this admission. Pt had an uneventful hospital course. Pt is stable for discharge to home 76yMale with history of Lumbar radiculopathy presenting for L4-5 laminectomy by Dr. Chandler on 7/27/21. Risk and benefits of surgery were explained to the patient. The patient understood and agreed to proceed with surgery. Patient underwent the procedure with no intraoperative complications. Pt was brought in stable condition to the PACU. Once stable in PACU, pt was brought to the floor. Patient was on continuous cardiac monitor for h/o CABG x3.  During hospital stay pt was followed by Medicine, physical therapy, Home Care during this admission. Pt had was treated for constipation. Pt is stable for discharge to rehab on POD# 76yMale with history of Lumbar radiculopathy presenting for L4-5 laminectomy by Dr. Chandler on 7/27/21. Risk and benefits of surgery were explained to the patient. The patient understood and agreed to proceed with surgery. Patient underwent the procedure with no intraoperative complications. Pt was brought in stable condition to the PACU. Once stable in PACU, pt was brought to the floor. Patient was on continuous cardiac monitor for h/o CABG x3.  During hospital stay pt was followed by Medicine, physical therapy, Home Care during this admission. Pt had was treated for constipation and post op illeus and followed by general surgery. Pt is stable for discharge to rehab on POD#6

## 2021-07-28 LAB
ANION GAP SERPL CALC-SCNC: 5 MMOL/L — SIGNIFICANT CHANGE UP (ref 5–17)
BASOPHILS # BLD AUTO: 0.02 K/UL — SIGNIFICANT CHANGE UP (ref 0–0.2)
BASOPHILS NFR BLD AUTO: 0.2 % — SIGNIFICANT CHANGE UP (ref 0–2)
BUN SERPL-MCNC: 29 MG/DL — HIGH (ref 7–23)
CALCIUM SERPL-MCNC: 8.7 MG/DL — SIGNIFICANT CHANGE UP (ref 8.5–10.1)
CHLORIDE SERPL-SCNC: 107 MMOL/L — SIGNIFICANT CHANGE UP (ref 96–108)
CO2 SERPL-SCNC: 26 MMOL/L — SIGNIFICANT CHANGE UP (ref 22–31)
COVID-19 SPIKE DOMAIN AB INTERP: POSITIVE
COVID-19 SPIKE DOMAIN ANTIBODY RESULT: >250 U/ML — HIGH
CREAT SERPL-MCNC: 1.61 MG/DL — HIGH (ref 0.5–1.3)
EOSINOPHIL # BLD AUTO: 0.15 K/UL — SIGNIFICANT CHANGE UP (ref 0–0.5)
EOSINOPHIL NFR BLD AUTO: 1.6 % — SIGNIFICANT CHANGE UP (ref 0–6)
GLUCOSE BLDC GLUCOMTR-MCNC: 157 MG/DL — HIGH (ref 70–99)
GLUCOSE BLDC GLUCOMTR-MCNC: 166 MG/DL — HIGH (ref 70–99)
GLUCOSE BLDC GLUCOMTR-MCNC: 173 MG/DL — HIGH (ref 70–99)
GLUCOSE BLDC GLUCOMTR-MCNC: 183 MG/DL — HIGH (ref 70–99)
GLUCOSE SERPL-MCNC: 185 MG/DL — HIGH (ref 70–99)
HCT VFR BLD CALC: 37.1 % — LOW (ref 39–50)
HGB BLD-MCNC: 11.8 G/DL — LOW (ref 13–17)
IMM GRANULOCYTES NFR BLD AUTO: 0.2 % — SIGNIFICANT CHANGE UP (ref 0–1.5)
LYMPHOCYTES # BLD AUTO: 1.27 K/UL — SIGNIFICANT CHANGE UP (ref 1–3.3)
LYMPHOCYTES # BLD AUTO: 13.9 % — SIGNIFICANT CHANGE UP (ref 13–44)
MCHC RBC-ENTMCNC: 27.1 PG — SIGNIFICANT CHANGE UP (ref 27–34)
MCHC RBC-ENTMCNC: 31.8 GM/DL — LOW (ref 32–36)
MCV RBC AUTO: 85.1 FL — SIGNIFICANT CHANGE UP (ref 80–100)
MONOCYTES # BLD AUTO: 0.78 K/UL — SIGNIFICANT CHANGE UP (ref 0–0.9)
MONOCYTES NFR BLD AUTO: 8.5 % — SIGNIFICANT CHANGE UP (ref 2–14)
NEUTROPHILS # BLD AUTO: 6.91 K/UL — SIGNIFICANT CHANGE UP (ref 1.8–7.4)
NEUTROPHILS NFR BLD AUTO: 75.6 % — SIGNIFICANT CHANGE UP (ref 43–77)
NRBC # BLD: 0 /100 WBCS — SIGNIFICANT CHANGE UP (ref 0–0)
PLATELET # BLD AUTO: 168 K/UL — SIGNIFICANT CHANGE UP (ref 150–400)
POTASSIUM SERPL-MCNC: 4.1 MMOL/L — SIGNIFICANT CHANGE UP (ref 3.5–5.3)
POTASSIUM SERPL-SCNC: 4.1 MMOL/L — SIGNIFICANT CHANGE UP (ref 3.5–5.3)
RBC # BLD: 4.36 M/UL — SIGNIFICANT CHANGE UP (ref 4.2–5.8)
RBC # FLD: 14.6 % — HIGH (ref 10.3–14.5)
SARS-COV-2 IGG+IGM SERPL QL IA: >250 U/ML — HIGH
SARS-COV-2 IGG+IGM SERPL QL IA: POSITIVE
SODIUM SERPL-SCNC: 138 MMOL/L — SIGNIFICANT CHANGE UP (ref 135–145)
WBC # BLD: 9.15 K/UL — SIGNIFICANT CHANGE UP (ref 3.8–10.5)
WBC # FLD AUTO: 9.15 K/UL — SIGNIFICANT CHANGE UP (ref 3.8–10.5)

## 2021-07-28 RX ORDER — CYCLOBENZAPRINE HYDROCHLORIDE 10 MG/1
5 TABLET, FILM COATED ORAL THREE TIMES A DAY
Refills: 0 | Status: DISCONTINUED | OUTPATIENT
Start: 2021-07-28 | End: 2021-08-02

## 2021-07-28 RX ADMIN — Medication 1: at 11:09

## 2021-07-28 RX ADMIN — LISINOPRIL 10 MILLIGRAM(S): 2.5 TABLET ORAL at 05:55

## 2021-07-28 RX ADMIN — OXYCODONE HYDROCHLORIDE 10 MILLIGRAM(S): 5 TABLET ORAL at 02:40

## 2021-07-28 RX ADMIN — Medication 1: at 16:44

## 2021-07-28 RX ADMIN — SODIUM CHLORIDE 75 MILLILITER(S): 9 INJECTION, SOLUTION INTRAVENOUS at 07:34

## 2021-07-28 RX ADMIN — OXYCODONE HYDROCHLORIDE 10 MILLIGRAM(S): 5 TABLET ORAL at 17:45

## 2021-07-28 RX ADMIN — PANTOPRAZOLE SODIUM 40 MILLIGRAM(S): 20 TABLET, DELAYED RELEASE ORAL at 05:55

## 2021-07-28 RX ADMIN — Medication 975 MILLIGRAM(S): at 22:11

## 2021-07-28 RX ADMIN — Medication 975 MILLIGRAM(S): at 06:52

## 2021-07-28 RX ADMIN — Medication 975 MILLIGRAM(S): at 14:38

## 2021-07-28 RX ADMIN — HYDROMORPHONE HYDROCHLORIDE 0.5 MILLIGRAM(S): 2 INJECTION INTRAMUSCULAR; INTRAVENOUS; SUBCUTANEOUS at 09:02

## 2021-07-28 RX ADMIN — Medication 1: at 07:34

## 2021-07-28 RX ADMIN — OXYCODONE HYDROCHLORIDE 10 MILLIGRAM(S): 5 TABLET ORAL at 05:55

## 2021-07-28 RX ADMIN — OXYCODONE HYDROCHLORIDE 10 MILLIGRAM(S): 5 TABLET ORAL at 16:45

## 2021-07-28 RX ADMIN — Medication 975 MILLIGRAM(S): at 21:11

## 2021-07-28 RX ADMIN — HYDROMORPHONE HYDROCHLORIDE 0.5 MILLIGRAM(S): 2 INJECTION INTRAMUSCULAR; INTRAVENOUS; SUBCUTANEOUS at 09:15

## 2021-07-28 RX ADMIN — OXYCODONE HYDROCHLORIDE 10 MILLIGRAM(S): 5 TABLET ORAL at 01:42

## 2021-07-28 RX ADMIN — Medication 975 MILLIGRAM(S): at 15:38

## 2021-07-28 RX ADMIN — SIMVASTATIN 40 MILLIGRAM(S): 20 TABLET, FILM COATED ORAL at 21:11

## 2021-07-28 RX ADMIN — HYDROMORPHONE HYDROCHLORIDE 0.5 MILLIGRAM(S): 2 INJECTION INTRAMUSCULAR; INTRAVENOUS; SUBCUTANEOUS at 02:57

## 2021-07-28 RX ADMIN — HYDROMORPHONE HYDROCHLORIDE 0.5 MILLIGRAM(S): 2 INJECTION INTRAMUSCULAR; INTRAVENOUS; SUBCUTANEOUS at 03:15

## 2021-07-28 RX ADMIN — Medication 100 MILLIGRAM(S): at 01:41

## 2021-07-28 RX ADMIN — SENNA PLUS 2 TABLET(S): 8.6 TABLET ORAL at 21:11

## 2021-07-28 RX ADMIN — Medication 975 MILLIGRAM(S): at 05:55

## 2021-07-28 RX ADMIN — SODIUM CHLORIDE 75 MILLILITER(S): 9 INJECTION, SOLUTION INTRAVENOUS at 21:11

## 2021-07-28 RX ADMIN — CYCLOBENZAPRINE HYDROCHLORIDE 10 MILLIGRAM(S): 10 TABLET, FILM COATED ORAL at 05:55

## 2021-07-28 RX ADMIN — Medication 25 MILLIGRAM(S): at 05:55

## 2021-07-28 RX ADMIN — OXYCODONE HYDROCHLORIDE 10 MILLIGRAM(S): 5 TABLET ORAL at 06:52

## 2021-07-28 NOTE — PHYSICAL THERAPY INITIAL EVALUATION ADULT - GAIT TRAINING, PT EVAL
Pt will be able to ambulate 200ft independently with rolling walker in order to navigate home and community in 1-2 weeks.

## 2021-07-28 NOTE — PHYSICAL THERAPY INITIAL EVALUATION ADULT - ADDITIONAL COMMENTS
Pt lives in a private house with his wife that has 5 stairs to get in with wide bilateral rails. Once inside, bedroom and bathroom are on the second floor which has 12 steps to get to with a L rail. Prior to surgery, pt was independent in all ambulation, transfers, and ADLs, did not use any DME, and was a community ambulator.

## 2021-07-28 NOTE — PHYSICAL THERAPY INITIAL EVALUATION ADULT - STRENGTHENING, PT EVAL
Pt will increase strength in B LE to good to improve ability to transfer, improve gait, and decrease risk of falls in 3-4 weeks.

## 2021-07-28 NOTE — PROGRESS NOTE ADULT - SUBJECTIVE AND OBJECTIVE BOX
76yMale s/p Laminectomy L4-5 with durotomy repair . Pt seen and examined in NAD. Pt c/o back pain but states the pain medication is helping. Pt denies any new complaints. Pt denies CP/SOB/N/V/D/numbness/tingling/bowel or bladder dysfunction. No headaches or photophobia. (+)voids No flatus/BM    PE:   Spine: Dressing c/d/i +HV +JUDY   B/L UE: Skin intact. +ROM shoulder/elbow/wrist/fingers. +ok/thumbsup/fingercross signs.  strength: 5/5.  RP2+ NVI.   B/L LE: Skin intact. +ROM hip/knee/ankle/toes. Ankle Dorsi/plantarflexion: 5/5. Calf: soft, compressible and nontender. DP/PT 2+ NVI.                               11.8   9.15  )-----------( 168      ( 28 Jul 2021 06:37 )             37.1       07-28    138  |  107  |  29<H>  ----------------------------<  185<H>  4.1   |  26  |  1.61<H>    Ca    8.7      28 Jul 2021 06:37          A/P: 76yMale s/p  Laminectomy L4-5 with durotomy repair POD#1  Will slowly increase HOB this morning with goal to OOB and ambulate as patient tolerates.   Pain control prn  PT: WBAT - spinal precautions   DVT ppx: SCDs and ambulation  Wound care, Isometric exercises, incentive spirometry   Discharge: planning Home once OOB and ambulating well.   All the above discussed and understood by pt

## 2021-07-28 NOTE — PHYSICAL THERAPY INITIAL EVALUATION ADULT - TRANSFER TRAINING, PT EVAL
Pt will be able to perform sit to/from stand transfer independently with rolling walker in 2-3 weeks.

## 2021-07-28 NOTE — PHYSICAL THERAPY INITIAL EVALUATION ADULT - BED MOBILITY TRAINING, PT EVAL
Pt will be able to safely maneuver from supine to/from sit independently in order to get into and out of bed in 1-2 weeks.

## 2021-07-28 NOTE — PHYSICAL THERAPY INITIAL EVALUATION ADULT - GENERAL OBSERVATIONS, REHAB EVAL
Pt encountered sidelying in bed, A&Ox4, in no acute distress with +TEDS, +cardiac monitor, +nasal cannula on 2L O2

## 2021-07-28 NOTE — PROGRESS NOTE ADULT - SUBJECTIVE AND OBJECTIVE BOX
SONIYA STALLWORTH is a 76y Male s/p DECOMPRESSION LAMINECTOMY L4-5 WITH IMAGE    EXTENSIVE SURGERY        denies any chest pain shortness of breath palpitation dizziness lightheadedness nausea vomiting fever or chills    T(C): 36.4 (07-28-21 @ 09:00), Max: 36.9 (07-27-21 @ 19:57)  HR: 70 (07-28-21 @ 09:00) (59 - 100)  BP: 116/76 (07-28-21 @ 09:00) (113/75 - 156/78)  RR: 17 (07-28-21 @ 09:00) (15 - 20)  SpO2: 98% (07-28-21 @ 09:00) (96% - 100%)  no jvd/bruit  s1 s2 rrr  cta  s/nt/nd  no calf tend                        11.8   9.15  )-----------( 168      ( 28 Jul 2021 06:37 )             37.1   07-28    138  |  107  |  29<H>  ----------------------------<  185<H>  4.1   |  26  |  1.61<H>    Ca    8.7      28 Jul 2021 06:37        cont dvt px  pain control  bowel regimen  antiemetics  incentive spirometer

## 2021-07-28 NOTE — PHYSICAL THERAPY INITIAL EVALUATION ADULT - BALANCE TRAINING, PT EVAL
Pt will improve standing static and dynamic balance by one grade with rolling walker to decrease risk of falls and increase safety in 1-2 weeks.

## 2021-07-29 LAB
ANION GAP SERPL CALC-SCNC: 12 MMOL/L — SIGNIFICANT CHANGE UP (ref 5–17)
BASOPHILS # BLD AUTO: 0.02 K/UL — SIGNIFICANT CHANGE UP (ref 0–0.2)
BASOPHILS NFR BLD AUTO: 0.2 % — SIGNIFICANT CHANGE UP (ref 0–2)
BUN SERPL-MCNC: 25 MG/DL — HIGH (ref 7–23)
CALCIUM SERPL-MCNC: 9 MG/DL — SIGNIFICANT CHANGE UP (ref 8.5–10.1)
CHLORIDE SERPL-SCNC: 105 MMOL/L — SIGNIFICANT CHANGE UP (ref 96–108)
CO2 SERPL-SCNC: 22 MMOL/L — SIGNIFICANT CHANGE UP (ref 22–31)
CREAT SERPL-MCNC: 1.84 MG/DL — HIGH (ref 0.5–1.3)
EOSINOPHIL # BLD AUTO: 0.16 K/UL — SIGNIFICANT CHANGE UP (ref 0–0.5)
EOSINOPHIL NFR BLD AUTO: 1.5 % — SIGNIFICANT CHANGE UP (ref 0–6)
FLUAV AG NPH QL: SIGNIFICANT CHANGE UP
FLUBV AG NPH QL: SIGNIFICANT CHANGE UP
GLUCOSE BLDC GLUCOMTR-MCNC: 162 MG/DL — HIGH (ref 70–99)
GLUCOSE BLDC GLUCOMTR-MCNC: 171 MG/DL — HIGH (ref 70–99)
GLUCOSE BLDC GLUCOMTR-MCNC: 188 MG/DL — HIGH (ref 70–99)
GLUCOSE BLDC GLUCOMTR-MCNC: 193 MG/DL — HIGH (ref 70–99)
GLUCOSE SERPL-MCNC: 176 MG/DL — HIGH (ref 70–99)
HCT VFR BLD CALC: 38.3 % — LOW (ref 39–50)
HGB BLD-MCNC: 11.8 G/DL — LOW (ref 13–17)
IMM GRANULOCYTES NFR BLD AUTO: 0.4 % — SIGNIFICANT CHANGE UP (ref 0–1.5)
LYMPHOCYTES # BLD AUTO: 1.78 K/UL — SIGNIFICANT CHANGE UP (ref 1–3.3)
LYMPHOCYTES # BLD AUTO: 17 % — SIGNIFICANT CHANGE UP (ref 13–44)
MCHC RBC-ENTMCNC: 26.8 PG — LOW (ref 27–34)
MCHC RBC-ENTMCNC: 30.8 GM/DL — LOW (ref 32–36)
MCV RBC AUTO: 86.8 FL — SIGNIFICANT CHANGE UP (ref 80–100)
MONOCYTES # BLD AUTO: 0.94 K/UL — HIGH (ref 0–0.9)
MONOCYTES NFR BLD AUTO: 9 % — SIGNIFICANT CHANGE UP (ref 2–14)
NEUTROPHILS # BLD AUTO: 7.56 K/UL — HIGH (ref 1.8–7.4)
NEUTROPHILS NFR BLD AUTO: 71.9 % — SIGNIFICANT CHANGE UP (ref 43–77)
NRBC # BLD: 0 /100 WBCS — SIGNIFICANT CHANGE UP (ref 0–0)
PLATELET # BLD AUTO: 157 K/UL — SIGNIFICANT CHANGE UP (ref 150–400)
POTASSIUM SERPL-MCNC: 3.9 MMOL/L — SIGNIFICANT CHANGE UP (ref 3.5–5.3)
POTASSIUM SERPL-SCNC: 3.9 MMOL/L — SIGNIFICANT CHANGE UP (ref 3.5–5.3)
RBC # BLD: 4.41 M/UL — SIGNIFICANT CHANGE UP (ref 4.2–5.8)
RBC # FLD: 14.6 % — HIGH (ref 10.3–14.5)
SARS-COV-2 RNA SPEC QL NAA+PROBE: SIGNIFICANT CHANGE UP
SODIUM SERPL-SCNC: 139 MMOL/L — SIGNIFICANT CHANGE UP (ref 135–145)
WBC # BLD: 10.5 K/UL — SIGNIFICANT CHANGE UP (ref 3.8–10.5)
WBC # FLD AUTO: 10.5 K/UL — SIGNIFICANT CHANGE UP (ref 3.8–10.5)

## 2021-07-29 RX ORDER — ACETAMINOPHEN 500 MG
1000 TABLET ORAL ONCE
Refills: 0 | Status: COMPLETED | OUTPATIENT
Start: 2021-07-29 | End: 2022-06-27

## 2021-07-29 RX ORDER — SIMETHICONE 80 MG/1
80 TABLET, CHEWABLE ORAL THREE TIMES A DAY
Refills: 0 | Status: DISCONTINUED | OUTPATIENT
Start: 2021-07-29 | End: 2021-08-02

## 2021-07-29 RX ORDER — LACTULOSE 10 G/15ML
20 SOLUTION ORAL
Refills: 0 | Status: COMPLETED | OUTPATIENT
Start: 2021-07-29 | End: 2021-07-30

## 2021-07-29 RX ORDER — METOCLOPRAMIDE HCL 10 MG
10 TABLET ORAL EVERY 8 HOURS
Refills: 0 | Status: COMPLETED | OUTPATIENT
Start: 2021-07-29 | End: 2021-07-30

## 2021-07-29 RX ADMIN — PANTOPRAZOLE SODIUM 40 MILLIGRAM(S): 20 TABLET, DELAYED RELEASE ORAL at 05:34

## 2021-07-29 RX ADMIN — Medication 1: at 08:19

## 2021-07-29 RX ADMIN — Medication 975 MILLIGRAM(S): at 22:14

## 2021-07-29 RX ADMIN — LACTULOSE 20 GRAM(S): 10 SOLUTION ORAL at 17:38

## 2021-07-29 RX ADMIN — OXYCODONE HYDROCHLORIDE 10 MILLIGRAM(S): 5 TABLET ORAL at 10:07

## 2021-07-29 RX ADMIN — OXYCODONE HYDROCHLORIDE 10 MILLIGRAM(S): 5 TABLET ORAL at 06:34

## 2021-07-29 RX ADMIN — LISINOPRIL 10 MILLIGRAM(S): 2.5 TABLET ORAL at 05:34

## 2021-07-29 RX ADMIN — HYDROMORPHONE HYDROCHLORIDE 0.5 MILLIGRAM(S): 2 INJECTION INTRAMUSCULAR; INTRAVENOUS; SUBCUTANEOUS at 14:04

## 2021-07-29 RX ADMIN — Medication 975 MILLIGRAM(S): at 06:34

## 2021-07-29 RX ADMIN — OXYCODONE HYDROCHLORIDE 10 MILLIGRAM(S): 5 TABLET ORAL at 09:37

## 2021-07-29 RX ADMIN — Medication 975 MILLIGRAM(S): at 05:34

## 2021-07-29 RX ADMIN — SENNA PLUS 2 TABLET(S): 8.6 TABLET ORAL at 22:14

## 2021-07-29 RX ADMIN — SIMETHICONE 80 MILLIGRAM(S): 80 TABLET, CHEWABLE ORAL at 17:38

## 2021-07-29 RX ADMIN — SIMETHICONE 80 MILLIGRAM(S): 80 TABLET, CHEWABLE ORAL at 11:43

## 2021-07-29 RX ADMIN — OXYCODONE HYDROCHLORIDE 10 MILLIGRAM(S): 5 TABLET ORAL at 05:34

## 2021-07-29 RX ADMIN — Medication 975 MILLIGRAM(S): at 14:39

## 2021-07-29 RX ADMIN — Medication 1: at 11:44

## 2021-07-29 RX ADMIN — Medication 1: at 16:54

## 2021-07-29 RX ADMIN — Medication 81 MILLIGRAM(S): at 11:43

## 2021-07-29 RX ADMIN — Medication 10 MILLIGRAM(S): at 11:43

## 2021-07-29 RX ADMIN — SIMVASTATIN 40 MILLIGRAM(S): 20 TABLET, FILM COATED ORAL at 22:17

## 2021-07-29 RX ADMIN — SIMETHICONE 80 MILLIGRAM(S): 80 TABLET, CHEWABLE ORAL at 22:15

## 2021-07-29 RX ADMIN — Medication 10 MILLIGRAM(S): at 22:14

## 2021-07-29 RX ADMIN — HYDROMORPHONE HYDROCHLORIDE 0.5 MILLIGRAM(S): 2 INJECTION INTRAMUSCULAR; INTRAVENOUS; SUBCUTANEOUS at 14:39

## 2021-07-29 RX ADMIN — Medication 25 MILLIGRAM(S): at 05:34

## 2021-07-29 RX ADMIN — SODIUM CHLORIDE 75 MILLILITER(S): 9 INJECTION, SOLUTION INTRAVENOUS at 05:34

## 2021-07-29 RX ADMIN — Medication 975 MILLIGRAM(S): at 23:14

## 2021-07-29 RX ADMIN — Medication 975 MILLIGRAM(S): at 13:33

## 2021-07-29 RX ADMIN — CYCLOBENZAPRINE HYDROCHLORIDE 5 MILLIGRAM(S): 10 TABLET, FILM COATED ORAL at 22:14

## 2021-07-29 RX ADMIN — CYCLOBENZAPRINE HYDROCHLORIDE 5 MILLIGRAM(S): 10 TABLET, FILM COATED ORAL at 02:48

## 2021-07-29 NOTE — PROGRESS NOTE ADULT - SUBJECTIVE AND OBJECTIVE BOX
76yMale s/p  L4-L5/Dural closure POD#2. Pt seen and examined in NAD. Pain controlled. Pt denies any new complaints. Pt denies CP/SOB/N/V/D/numbness/tingling/bowel or bladder dysfunction.     PE:   Neuro: AAOX3  Abd: Distended. NTTP.   Spine: Dressing c/d/i.   B/L UE: Skin intact. +ROM shoulder/elbow/wrist/fingers. +ok/thumbsup/fingercross signs.  strength: 5/5.  RP2+ NVI.   RLE: Skin intact. +ROM hip/knee/ankle/toes. quad/hamstring motor 5/5.  Ankle Dorsi/plantarflexion: 5/5. Calf: soft, compressible and nontender. DP/PT 2+ NVI  LLE: Skin intact. +ROM hip/knee/ankle/toes. Quad motor 4/5, hamstring motor 5/5  Ankle Dorsi/plantarflexion: 5/5. Calf: soft, compressible and nontender. DP/PT 2+ NVI.                             11.8   10.50 )-----------( 157      ( 29 Jul 2021 06:56 )             38.3       07-29    139  |  105  |  25<H>  ----------------------------<  176<H>  3.9   |  22  |  1.84<H>    Ca    9.0      29 Jul 2021 06:56          A/P: 76yMale s/p L4-L5/Dural closure POD#2   Pain controlled  PT: WBAT - spinal precautions   DVT ppx: SCDs   GI: Reglan/simethicone. Add dulcolax BID.   Wound care, Isometric exercises, incentive spirometry   Medical consult appreciated  Discharge: planning rehab pending.   All the above discussed and understood by pt

## 2021-07-29 NOTE — PROGRESS NOTE ADULT - SUBJECTIVE AND OBJECTIVE BOX
SONIYA STALLWORTH is a 76y Male s/p DECOMPRESSION LAMINECTOMY L4-5 WITH IMAGE    EXTENSIVE SURGERY        denies any chest pain shortness of breath palpitation dizziness lightheadedness nausea vomiting fever or chills    T(C): 37.2 (07-29-21 @ 09:31), Max: 37.6 (07-29-21 @ 05:28)  HR: 93 (07-29-21 @ 09:31) (82 - 98)  BP: 133/87 (07-29-21 @ 09:31) (116/76 - 152/86)  RR: 16 (07-29-21 @ 09:31) (15 - 17)  SpO2: 95% (07-29-21 @ 09:31) (95% - 99%)  no jvd/bruit  s1 s2 rrr  cta  s/nt/nd  no calf tend                        11.8   10.50 )-----------( 157      ( 29 Jul 2021 06:56 )             38.3   07-29    139  |  105  |  25<H>  ----------------------------<  176<H>  3.9   |  22  |  1.84<H>    Ca    9.0      29 Jul 2021 06:56        cont dvt px  pain control  bowel regimen  antiemetics  incentive spirometer

## 2021-07-29 NOTE — CONSULT NOTE ADULT - SUBJECTIVE AND OBJECTIVE BOX
SONIYA STALLWORTH is a 76y Male s/p DECOMPRESSION LAMINECTOMY L4-5 WITH IMAGE    EXTENSIVE SURGERY      w/ h/o S/P CABG x 3    HTN (hypertension)    HLD (hyperlipidemia)    GERD (gastroesophageal reflux disease)    DM (diabetes mellitus)      denies any chest pain shortness of breath palpitation dizziness lightheadedness nausea vomiting fever or chills    S/P CABG x 3    H/O hernia repair    History of transurethral prostatectomy    History of surgery on arm    H/O elbow surgery        SH: doesnot smoke or drink at this time    No Known Allergies    acetaminophen   Tablet .. 975 milliGRAM(s) Oral every 8 hours  aspirin enteric coated 81 milliGRAM(s) Oral daily  cyclobenzaprine 5 milliGRAM(s) Oral three times a day PRN  dextrose 40% Gel 15 Gram(s) Oral once  dextrose 5%. 1000 milliLiter(s) IV Continuous <Continuous>  dextrose 5%. 1000 milliLiter(s) IV Continuous <Continuous>  dextrose 50% Injectable 25 Gram(s) IV Push once  dextrose 50% Injectable 12.5 Gram(s) IV Push once  dextrose 50% Injectable 25 Gram(s) IV Push once  diphenhydrAMINE   Injectable 12.5 milliGRAM(s) IV Push every 4 hours PRN  glucagon  Injectable 1 milliGRAM(s) IntraMuscular once  HYDROmorphone  Injectable 0.5 milliGRAM(s) IV Push every 3 hours PRN  insulin lispro (ADMELOG) corrective regimen sliding scale   SubCutaneous three times a day before meals  lactated ringers. 1000 milliLiter(s) IV Continuous <Continuous>  lisinopril 10 milliGRAM(s) Oral daily  metoclopramide Injectable 10 milliGRAM(s) IV Push once PRN  metoprolol succinate ER 25 milliGRAM(s) Oral daily  oxyCODONE    IR 5 milliGRAM(s) Oral every 4 hours PRN  oxyCODONE    IR 10 milliGRAM(s) Oral every 4 hours PRN  pantoprazole    Tablet 40 milliGRAM(s) Oral before breakfast  senna 2 Tablet(s) Oral at bedtime  simvastatin 40 milliGRAM(s) Oral at bedtime    T(C): 37.2 (07-29-21 @ 09:31), Max: 37.6 (07-29-21 @ 05:28)  HR: 93 (07-29-21 @ 09:31) (77 - 98)  BP: 133/87 (07-29-21 @ 09:31) (116/76 - 158/88)  RR: 16 (07-29-21 @ 09:31) (15 - 17)  SpO2: 95% (07-29-21 @ 09:31) (95% - 100%)  HEENT unremarkable  neck no JVD or bruit  heart normal S1 S2 RRR no gallops or rubs  chest clear to auscultation  abd sof nontender non distended +bs  ext no calf tenderness    A/P   DVT PX  pain control  bowel regimen   wound care as per ortho  GI PX  antiemetics prn  incentive spirometer

## 2021-07-30 LAB
ANION GAP SERPL CALC-SCNC: 11 MMOL/L — SIGNIFICANT CHANGE UP (ref 5–17)
BASOPHILS # BLD AUTO: 0.02 K/UL — SIGNIFICANT CHANGE UP (ref 0–0.2)
BASOPHILS NFR BLD AUTO: 0.2 % — SIGNIFICANT CHANGE UP (ref 0–2)
BUN SERPL-MCNC: 25 MG/DL — HIGH (ref 7–23)
CALCIUM SERPL-MCNC: 9 MG/DL — SIGNIFICANT CHANGE UP (ref 8.5–10.1)
CHLORIDE SERPL-SCNC: 108 MMOL/L — SIGNIFICANT CHANGE UP (ref 96–108)
CO2 SERPL-SCNC: 19 MMOL/L — LOW (ref 22–31)
CREAT SERPL-MCNC: 1.64 MG/DL — HIGH (ref 0.5–1.3)
EOSINOPHIL # BLD AUTO: 0.21 K/UL — SIGNIFICANT CHANGE UP (ref 0–0.5)
EOSINOPHIL NFR BLD AUTO: 2.3 % — SIGNIFICANT CHANGE UP (ref 0–6)
GLUCOSE BLDC GLUCOMTR-MCNC: 199 MG/DL — HIGH (ref 70–99)
GLUCOSE BLDC GLUCOMTR-MCNC: 202 MG/DL — HIGH (ref 70–99)
GLUCOSE BLDC GLUCOMTR-MCNC: 205 MG/DL — HIGH (ref 70–99)
GLUCOSE SERPL-MCNC: 179 MG/DL — HIGH (ref 70–99)
HCT VFR BLD CALC: 35.4 % — LOW (ref 39–50)
HGB BLD-MCNC: 11.4 G/DL — LOW (ref 13–17)
IMM GRANULOCYTES NFR BLD AUTO: 0.2 % — SIGNIFICANT CHANGE UP (ref 0–1.5)
LYMPHOCYTES # BLD AUTO: 1.59 K/UL — SIGNIFICANT CHANGE UP (ref 1–3.3)
LYMPHOCYTES # BLD AUTO: 17.4 % — SIGNIFICANT CHANGE UP (ref 13–44)
MCHC RBC-ENTMCNC: 27.5 PG — SIGNIFICANT CHANGE UP (ref 27–34)
MCHC RBC-ENTMCNC: 32.2 GM/DL — SIGNIFICANT CHANGE UP (ref 32–36)
MCV RBC AUTO: 85.3 FL — SIGNIFICANT CHANGE UP (ref 80–100)
MONOCYTES # BLD AUTO: 0.88 K/UL — SIGNIFICANT CHANGE UP (ref 0–0.9)
MONOCYTES NFR BLD AUTO: 9.6 % — SIGNIFICANT CHANGE UP (ref 2–14)
NEUTROPHILS # BLD AUTO: 6.42 K/UL — SIGNIFICANT CHANGE UP (ref 1.8–7.4)
NEUTROPHILS NFR BLD AUTO: 70.3 % — SIGNIFICANT CHANGE UP (ref 43–77)
NRBC # BLD: 0 /100 WBCS — SIGNIFICANT CHANGE UP (ref 0–0)
PLATELET # BLD AUTO: 159 K/UL — SIGNIFICANT CHANGE UP (ref 150–400)
POTASSIUM SERPL-MCNC: 4 MMOL/L — SIGNIFICANT CHANGE UP (ref 3.5–5.3)
POTASSIUM SERPL-SCNC: 4 MMOL/L — SIGNIFICANT CHANGE UP (ref 3.5–5.3)
RBC # BLD: 4.15 M/UL — LOW (ref 4.2–5.8)
RBC # FLD: 14.6 % — HIGH (ref 10.3–14.5)
SODIUM SERPL-SCNC: 138 MMOL/L — SIGNIFICANT CHANGE UP (ref 135–145)
WBC # BLD: 9.14 K/UL — SIGNIFICANT CHANGE UP (ref 3.8–10.5)
WBC # FLD AUTO: 9.14 K/UL — SIGNIFICANT CHANGE UP (ref 3.8–10.5)

## 2021-07-30 PROCEDURE — 74018 RADEX ABDOMEN 1 VIEW: CPT | Mod: 26

## 2021-07-30 RX ORDER — MULTIVIT WITH MIN/MFOLATE/K2 340-15/3 G
0.5 POWDER (GRAM) ORAL
Refills: 0 | Status: COMPLETED | OUTPATIENT
Start: 2021-07-30 | End: 2021-07-30

## 2021-07-30 RX ORDER — ACETAMINOPHEN 500 MG
1000 TABLET ORAL ONCE
Refills: 0 | Status: COMPLETED | OUTPATIENT
Start: 2021-07-30 | End: 2021-07-30

## 2021-07-30 RX ORDER — METOCLOPRAMIDE HCL 10 MG
10 TABLET ORAL EVERY 8 HOURS
Refills: 0 | Status: COMPLETED | OUTPATIENT
Start: 2021-07-30 | End: 2021-08-01

## 2021-07-30 RX ADMIN — PANTOPRAZOLE SODIUM 40 MILLIGRAM(S): 20 TABLET, DELAYED RELEASE ORAL at 05:28

## 2021-07-30 RX ADMIN — LISINOPRIL 10 MILLIGRAM(S): 2.5 TABLET ORAL at 05:27

## 2021-07-30 RX ADMIN — OXYCODONE HYDROCHLORIDE 10 MILLIGRAM(S): 5 TABLET ORAL at 05:31

## 2021-07-30 RX ADMIN — CYCLOBENZAPRINE HYDROCHLORIDE 5 MILLIGRAM(S): 10 TABLET, FILM COATED ORAL at 05:28

## 2021-07-30 RX ADMIN — Medication 10 MILLIGRAM(S): at 21:45

## 2021-07-30 RX ADMIN — Medication 975 MILLIGRAM(S): at 06:27

## 2021-07-30 RX ADMIN — Medication 12.5 MILLIGRAM(S): at 21:45

## 2021-07-30 RX ADMIN — Medication 1 ENEMA: at 12:55

## 2021-07-30 RX ADMIN — Medication 0.5 BOTTLE: at 19:45

## 2021-07-30 RX ADMIN — Medication 25 MILLIGRAM(S): at 05:31

## 2021-07-30 RX ADMIN — Medication 2: at 08:14

## 2021-07-30 RX ADMIN — OXYCODONE HYDROCHLORIDE 10 MILLIGRAM(S): 5 TABLET ORAL at 06:31

## 2021-07-30 RX ADMIN — LACTULOSE 20 GRAM(S): 10 SOLUTION ORAL at 05:28

## 2021-07-30 RX ADMIN — SIMETHICONE 80 MILLIGRAM(S): 80 TABLET, CHEWABLE ORAL at 05:28

## 2021-07-30 RX ADMIN — Medication 0.5 BOTTLE: at 18:06

## 2021-07-30 RX ADMIN — Medication 400 MILLIGRAM(S): at 21:45

## 2021-07-30 RX ADMIN — Medication 10 MILLIGRAM(S): at 05:28

## 2021-07-30 RX ADMIN — Medication 1: at 12:55

## 2021-07-30 RX ADMIN — Medication 1000 MILLIGRAM(S): at 22:45

## 2021-07-30 RX ADMIN — Medication 81 MILLIGRAM(S): at 12:55

## 2021-07-30 RX ADMIN — Medication 10 MILLIGRAM(S): at 09:36

## 2021-07-30 RX ADMIN — Medication 975 MILLIGRAM(S): at 05:27

## 2021-07-30 NOTE — CONSULT NOTE ADULT - ASSESSMENT
76 year old male s/p Lami L4-5/Dural closure POD#3 with Post op Ileus  PMH: HTN, CABGx3, DM, HLD, GERD     - Ambulate as much as tolerated per Ortho restrictions  - Continue with current Bowel regimen, encourage fluid  - Limit narcotics , Add Reglan TID  Mag citrate requested by patient  - discussed with Dr Doran   76 year old male s/p Lami L4-5/Dural closure POD#3 with Post op Ileus  PMH: HTN, CABGx3, DM, HLD, GERD     KUB reviewed, Abdomen is softly distended + Flatus, consistent with ileus, compounded by immobility and Opiate use.    - Ambulate as much as tolerated per Ortho restrictions  - Continue with current Bowel regimen, encourage fluid  - Limit narcotics , Add Reglan TID  - Mag citrate requested by patient  - discussed with Dr Doran

## 2021-07-30 NOTE — PROGRESS NOTE ADULT - SUBJECTIVE AND OBJECTIVE BOX
SONIYA STALLWORTH is a 76y Male s/p DECOMPRESSION LAMINECTOMY L4-5 WITH IMAGE    EXTENSIVE SURGERY        denies any chest pain shortness of breath palpitation dizziness lightheadedness nausea vomiting fever or chills    T(C): 37.2 (07-30-21 @ 04:00), Max: 37.3 (07-29-21 @ 13:37)  HR: 98 (07-30-21 @ 04:00) (88 - 101)  BP: 148/95 (07-30-21 @ 04:00) (113/77 - 148/95)  RR: 17 (07-30-21 @ 04:00) (16 - 18)  SpO2: 95% (07-30-21 @ 04:00) (95% - 96%)  no jvd/bruit  s1 s2 rrr  cta  s/nt/nd  no calf tend                        11.4   9.14  )-----------( 159      ( 30 Jul 2021 06:19 )             35.4   07-30    138  |  108  |  25<H>  ----------------------------<  179<H>  4.0   |  19<L>  |  1.64<H>    Ca    9.0      30 Jul 2021 06:19        cont dvt px  pain control  bowel regimen  antiemetics  incentive spirometer

## 2021-07-30 NOTE — PROGRESS NOTE ADULT - SUBJECTIVE AND OBJECTIVE BOX
As per Dr. Stephenson, the patient was placed NPO and abdominal XR ordered to rule out ileus/SBO As per Dr. Stephenson, the patient was placed NPO and abdominal XR ordered to rule out ileus/SBO. f/u General Surgery consultation.

## 2021-07-30 NOTE — PROGRESS NOTE ADULT - SUBJECTIVE AND OBJECTIVE BOX
76yMale s/p Lami L4-5/Dural closure POD#3. Pt seen and examined in NAD. Pain moderately controlled. No flatus. +belching.     PE:   Neuro: AAOX3  Abd: Distended. NTTP.   Spine: Dressing c/d/i, incision: sutures C/D/I.   B/L UE: Skin intact. +ROM shoulder/elbow/wrist/fingers. +ok/thumbsup/fingercross signs.  strength: 5/5.  RP2+ NVI.   RLE:Skin intact. +ROM hip/knee/ankle/toes. Quad/hamstring motor: 5/5. Ankle Dorsi/plantarflexion: 5/5. Calf: soft, compressible and nontender. DP/PT 2+ NVI.   LLE: Skin intact. +ROM hip/knee/ankle/toes. Quad motor 4/5, hamstring motor 5/5Ankle Dorsi/plantarflexion: 5/5. Calf: soft, compressible and nontender. DP/PT 2+ NVI.                             11.4   9.14  )-----------( 159      ( 30 Jul 2021 06:19 )             35.4       07-30    138  |  108  |  25<H>  ----------------------------<  179<H>  4.0   |  19<L>  |  1.64<H>    Ca    9.0      30 Jul 2021 06:19          A/P: 76yMale s/p Lami L4-5/Dural closure POD#3  Dressing changed -dry clean dressing applied  Pain control: Will not increase dosing of oxycodone or flexeril secondary to lethargy earlier during hospitalization   PT: WBAT - spinal precautions   DVT ppx: SCDs   GI: Dulcolax suppository.   Wound care, Isometric exercises, incentive spirometry  Discharge: planning rehab pending BM  All the above discussed and understood by pt

## 2021-07-30 NOTE — CONSULT NOTE ADULT - SUBJECTIVE AND OBJECTIVE BOX
Chief Complaint:  Patient is a 76y old  Male who presents with a chief complaint of L4-5 laminectomy (27 Jul 2021 13:04)      HPI:   76 year old male with a past medical history of HTN, CABGx3, DM, HLD, GERD c/o lower back pain and  b/l buttock pain that radiates down left and right leg (right leg daily, left intermittent)  with intermittent numbness and tingling to right foot.  He is scheduled for a decompression laminectomy L4-L5 on 7/27/2021.   He is now s/p `Lumbar discectomy, Surgery team consulted for post op ileus.     PMH/PSH:PAST MEDICAL & SURGICAL HISTORY:  S/P CABG x 3  2015    HTN (hypertension)    HLD (hyperlipidemia)    GERD (gastroesophageal reflux disease)    DM (diabetes mellitus)    S/P CABG x 3  2015    H/O hernia repair  x2    History of transurethral prostatectomy    History of surgery on arm  right humerous    H/O elbow surgery  right        Allergies:  No Known Allergies      Medications:  acetaminophen   Tablet .. 975 milliGRAM(s) Oral every 8 hours  acetaminophen  IVPB .. 1000 milliGRAM(s) IV Intermittent once  acetaminophen  IVPB .. 1000 milliGRAM(s) IV Intermittent once  aspirin enteric coated 81 milliGRAM(s) Oral daily  cyclobenzaprine 5 milliGRAM(s) Oral three times a day PRN  dextrose 40% Gel 15 Gram(s) Oral once  dextrose 5%. 1000 milliLiter(s) IV Continuous <Continuous>  dextrose 5%. 1000 milliLiter(s) IV Continuous <Continuous>  dextrose 50% Injectable 25 Gram(s) IV Push once  dextrose 50% Injectable 12.5 Gram(s) IV Push once  dextrose 50% Injectable 25 Gram(s) IV Push once  diphenhydrAMINE   Injectable 12.5 milliGRAM(s) IV Push every 4 hours PRN  glucagon  Injectable 1 milliGRAM(s) IntraMuscular once  HYDROmorphone  Injectable 0.5 milliGRAM(s) IV Push every 3 hours PRN  insulin lispro (ADMELOG) corrective regimen sliding scale   SubCutaneous three times a day before meals  lisinopril 10 milliGRAM(s) Oral daily  metoprolol succinate ER 25 milliGRAM(s) Oral daily  oxyCODONE    IR 5 milliGRAM(s) Oral every 4 hours PRN  oxyCODONE    IR 10 milliGRAM(s) Oral every 4 hours PRN  pantoprazole    Tablet 40 milliGRAM(s) Oral before breakfast  senna 2 Tablet(s) Oral at bedtime  simethicone 80 milliGRAM(s) Chew three times a day  simvastatin 40 milliGRAM(s) Oral at bedtime      REVIEW OF SYSTEMS:  All other review of systems is negative unless indicated above.    Relevant Family History:   FAMILY HISTORY:      Relevant Social History:  Denies ETOH or tobacco history    Physical Exam:    Vital Signs:  Vital Signs Last 24 Hrs  T(C): 37.2 (30 Jul 2021 14:33), Max: 37.2 (30 Jul 2021 04:00)  T(F): 98.9 (30 Jul 2021 14:33), Max: 98.9 (30 Jul 2021 04:00)  HR: 100 (30 Jul 2021 14:33) (97 - 116)  BP: 130/89 (30 Jul 2021 14:33) (126/81 - 197/116)  BP(mean): --  RR: 19 (30 Jul 2021 14:33) (16 - 19)  SpO2: 96% (30 Jul 2021 14:33) (95% - 97%)  Daily     Daily     Constitutional: WDWN resting comfortably in bed; NAD  HEENT: NC/AT, PERRL, EOMI, anicteric sclera, no nasal discharge; uvula midline, no oropharyngeal erythema or exudates  Neck: supple; no JVD or thyromegaly  Respiratory: CTA B/L; no W/R/R, no retractions  Cardiac: +S1/S2; RRR; no M/R/G; PMI non-displaced  Gastrointestinal: soft,+ Distention; no rebound or guarding; +BS   Extremities: , no clubbing or cyanosis; no peripheral edema  Musculoskeletal:  no joint swelling, tenderness or erythema  Vascular: 2+ radial, femoral, DP/PT pulses B/L  Skin: warm, dry and intact; no rashes, wounds, or scars  Neurologic: AAOx3; CNS grossly intact; no focal deficits no asterixis, no tremor, no encephalopathy    Laboratory:                          11.4   9.14  )-----------( 159      ( 30 Jul 2021 06:19 )             35.4     07-30    138  |  108  |  25<H>  ----------------------------<  179<H>  4.0   |  19<L>  |  1.64<H>    Ca    9.0      30 Jul 2021 06:19                Intake and Output    07-29-21 @ 07:01  -  07-30-21 @ 07:00  --------------------------------------------------------  IN: 750 mL / OUT: 0 mL / NET: 750 mL        Imaging:  < from: Xray Kidney Ureter Bladder (07.30.21 @ 15:19) >    EXAM:  XR KUB 1 VIEW                            PROCEDURE DATE:  07/30/2021          INTERPRETATION:  DATE OF EXAM: 7/30/21    COMPARISON: None.    CLINICAL INDICATION: Abdominal pain.    TECHNIQUE: 1 flat abdominal film.    FINDINGS:  Moderate retention of colonic stool seen. Moderate gaseous distention of transverse colon (8-9cm). Gas and stool seen in distal descending colon and sigmoid colon but paucity of rectal gas. No gaseous distention of small bowel.  No gross free intraperitoneal air.  Right pelvic surgical clips  noted.  Degenerative changes of the visualized spine    IMPRESSION:  1. Moderate retention of colonic stool.  2. Nonspecific transverse colonic ileus.  3. No small bowel gaseous distention.

## 2021-07-31 LAB
ANION GAP SERPL CALC-SCNC: 13 MMOL/L — SIGNIFICANT CHANGE UP (ref 5–17)
BUN SERPL-MCNC: 27 MG/DL — HIGH (ref 7–23)
CALCIUM SERPL-MCNC: 9.2 MG/DL — SIGNIFICANT CHANGE UP (ref 8.5–10.1)
CHLORIDE SERPL-SCNC: 108 MMOL/L — SIGNIFICANT CHANGE UP (ref 96–108)
CO2 SERPL-SCNC: 20 MMOL/L — LOW (ref 22–31)
CREAT SERPL-MCNC: 1.59 MG/DL — HIGH (ref 0.5–1.3)
GLUCOSE BLDC GLUCOMTR-MCNC: 140 MG/DL — HIGH (ref 70–99)
GLUCOSE BLDC GLUCOMTR-MCNC: 163 MG/DL — HIGH (ref 70–99)
GLUCOSE BLDC GLUCOMTR-MCNC: 212 MG/DL — HIGH (ref 70–99)
GLUCOSE BLDC GLUCOMTR-MCNC: 223 MG/DL — HIGH (ref 70–99)
GLUCOSE SERPL-MCNC: 216 MG/DL — HIGH (ref 70–99)
HCT VFR BLD CALC: 37.2 % — LOW (ref 39–50)
HGB BLD-MCNC: 11.8 G/DL — LOW (ref 13–17)
MCHC RBC-ENTMCNC: 27.3 PG — SIGNIFICANT CHANGE UP (ref 27–34)
MCHC RBC-ENTMCNC: 31.7 GM/DL — LOW (ref 32–36)
MCV RBC AUTO: 86.1 FL — SIGNIFICANT CHANGE UP (ref 80–100)
NRBC # BLD: 0 /100 WBCS — SIGNIFICANT CHANGE UP (ref 0–0)
PLATELET # BLD AUTO: 193 K/UL — SIGNIFICANT CHANGE UP (ref 150–400)
POTASSIUM SERPL-MCNC: 3.8 MMOL/L — SIGNIFICANT CHANGE UP (ref 3.5–5.3)
POTASSIUM SERPL-SCNC: 3.8 MMOL/L — SIGNIFICANT CHANGE UP (ref 3.5–5.3)
RBC # BLD: 4.32 M/UL — SIGNIFICANT CHANGE UP (ref 4.2–5.8)
RBC # FLD: 14.3 % — SIGNIFICANT CHANGE UP (ref 10.3–14.5)
SODIUM SERPL-SCNC: 141 MMOL/L — SIGNIFICANT CHANGE UP (ref 135–145)
WBC # BLD: 8.81 K/UL — SIGNIFICANT CHANGE UP (ref 3.8–10.5)
WBC # FLD AUTO: 8.81 K/UL — SIGNIFICANT CHANGE UP (ref 3.8–10.5)

## 2021-07-31 PROCEDURE — 99222 1ST HOSP IP/OBS MODERATE 55: CPT

## 2021-07-31 RX ORDER — ACETAMINOPHEN 500 MG
1000 TABLET ORAL ONCE
Refills: 0 | Status: COMPLETED | OUTPATIENT
Start: 2021-07-31 | End: 2021-07-31

## 2021-07-31 RX ORDER — ONDANSETRON 8 MG/1
4 TABLET, FILM COATED ORAL EVERY 12 HOURS
Refills: 0 | Status: DISCONTINUED | OUTPATIENT
Start: 2021-07-31 | End: 2021-07-31

## 2021-07-31 RX ORDER — PANTOPRAZOLE SODIUM 20 MG/1
40 TABLET, DELAYED RELEASE ORAL DAILY
Refills: 0 | Status: DISCONTINUED | OUTPATIENT
Start: 2021-07-31 | End: 2021-08-02

## 2021-07-31 RX ADMIN — LISINOPRIL 10 MILLIGRAM(S): 2.5 TABLET ORAL at 06:44

## 2021-07-31 RX ADMIN — Medication 400 MILLIGRAM(S): at 06:19

## 2021-07-31 RX ADMIN — Medication 81 MILLIGRAM(S): at 12:08

## 2021-07-31 RX ADMIN — Medication 2: at 08:06

## 2021-07-31 RX ADMIN — Medication 975 MILLIGRAM(S): at 22:10

## 2021-07-31 RX ADMIN — PANTOPRAZOLE SODIUM 40 MILLIGRAM(S): 20 TABLET, DELAYED RELEASE ORAL at 12:08

## 2021-07-31 RX ADMIN — SIMVASTATIN 40 MILLIGRAM(S): 20 TABLET, FILM COATED ORAL at 21:26

## 2021-07-31 RX ADMIN — Medication 25 MILLIGRAM(S): at 06:44

## 2021-07-31 RX ADMIN — Medication 975 MILLIGRAM(S): at 21:26

## 2021-07-31 RX ADMIN — Medication 1000 MILLIGRAM(S): at 06:34

## 2021-07-31 RX ADMIN — SIMETHICONE 80 MILLIGRAM(S): 80 TABLET, CHEWABLE ORAL at 15:25

## 2021-07-31 RX ADMIN — Medication 10 MILLIGRAM(S): at 13:34

## 2021-07-31 RX ADMIN — Medication 975 MILLIGRAM(S): at 14:34

## 2021-07-31 RX ADMIN — Medication 10 MILLIGRAM(S): at 06:19

## 2021-07-31 RX ADMIN — Medication 975 MILLIGRAM(S): at 13:34

## 2021-07-31 RX ADMIN — SIMETHICONE 80 MILLIGRAM(S): 80 TABLET, CHEWABLE ORAL at 21:26

## 2021-07-31 RX ADMIN — Medication 2: at 12:08

## 2021-07-31 NOTE — PROGRESS NOTE ADULT - SUBJECTIVE AND OBJECTIVE BOX
Patient seen and examined bedside resting comfortably.  Reports flatus and BM x 2 today  Denies n/v and abdominal pain  Afebrile    T(F): 98.6 (07-31-21 @ 16:11), Max: 98.7 (07-30-21 @ 19:46)  HR: 75 (07-31-21 @ 16:11) (73 - 99)  BP: 131/84 (07-31-21 @ 16:11) (131/84 - 168/118)  RR: 16 (07-31-21 @ 16:11) (16 - 19)  SpO2: 94% (07-31-21 @ 16:11) (94% - 97%)  Wt(kg): --      POCT Blood Glucose.: 140 mg/dL (31 Jul 2021 15:58)  POCT Blood Glucose.: 212 mg/dL (31 Jul 2021 11:31)  POCT Blood Glucose.: 223 mg/dL (31 Jul 2021 07:47)  POCT Blood Glucose.: 205 mg/dL (30 Jul 2021 21:45)      PHYSICAL EXAM:  General: NAD, WDWN  Neuro:  Alert & oriented x 3  HEENT: NCAT, EOMI, conjunctiva clear  CV: +S1+S2   Lung: crespirations nonlabored, good inspiratory effort  Abdomen: soft, NTND. Normoactive BS    LABS:                        11.8   8.81  )-----------( 193      ( 31 Jul 2021 06:01 )             37.2     07-31    141  |  108  |  27<H>  ----------------------------<  216<H>  3.8   |  20<L>  |  1.59<H>    Ca    9.2      31 Jul 2021 06:01    A/P: 76 year old male s/p Lami L4-5/Dural closure seen with post op ileus, clinically resolved  PMH: HTN, CABGx3, DM, HLD, GERD   - recommend advance to regular diet  - continue bowel regimen  - Ambulate as much as tolerated   - Limit narcotics   - c/w Reglan TID  - discussed with primary team

## 2021-07-31 NOTE — PROGRESS NOTE ADULT - SUBJECTIVE AND OBJECTIVE BOX
POD#4 S/P Lami L4-L5 with Dural repair  76yMale Patient seen and examined, Pain controlled  Patient Denies SOB, CP, N/V/D   Patient currently NPO for Post-op Ileus, Follow by Gen Surg  Patient states had BM with AM and Feels Relief       PE: L-Spine: Dressing C/D/I, Sensation/motor intact  Abd: Soft, Non TTP   B/L UE: Skin intact. +ROM shoulder/elbow/wrist/fingers. +ok/thumbsup/fingercross signs.  strength: 5/5.  RP2+ NVI.   B/L LE: Skin intact. +ROM hip/knee/ankle/toes. Ankle Dorsi/plantarflexion: 5/5. Calf: soft, compressible and nontender. DP/PT 2+ NVI                          11.8   8.81  )-----------( 193      ( 31 Jul 2021 06:01 )             37.2       07-31    141  |  108  |  27<H>  ----------------------------<  216<H>  3.8   |  20<L>  |  1.59<H>    Ca    9.2      31 Jul 2021 06:01          A: As above   P: Pain Control       DVT Prophylaxis      Incentive spirometry      D/W Dr. [place]       Maintain NPO, IV Fluids       Continue care as per Gen Surgery for Ileus       PT WBAT      Isometric exercises      Discharge Planning       All the above discussed and understood by pt       Ortho to F/U

## 2021-07-31 NOTE — CONSULT NOTE ADULT - SUBJECTIVE AND OBJECTIVE BOX
CHIEF COMPLAINT:  Patient is a 76y old  Male who presents with a chief complaint of L4-5 laminectomy (27 Jul 2021 13:04)      HPI:  77 yo M dm, htn, hld, sp laminectomy. ST, PVCs, EF 40%.    ALLERGIES:  No Known Allergies    Home Medications:  Aspirin Enteric Coated 81 mg oral delayed release tablet: 1 tab(s) orally once a day (27 Jul 2021 07:44)  CoQ10: 50 milligram(s) orally once a day (27 Jul 2021 07:44)  folic acid 0.4 mg oral tablet: 1 tab(s) orally once a day (27 Jul 2021 07:44)  glimepiride 4 mg oral tablet: 1  orally 2 times a day (27 Jul 2021 07:44)  Januvia 100 mg oral tablet: 1 tab(s) orally once a day (27 Jul 2021 07:44)  metFORMIN 1000 mg oral tablet: 1 tab(s) orally 2 times a day (27 Jul 2021 07:44)  Metoprolol Succinate ER 25 mg oral tablet, extended release: 1 tab(s) orally once a day (27 Jul 2021 07:44)  Omega-3 1000 mg oral capsule: 1 cap(s) orally 2 times a day (27 Jul 2021 07:44)  pantoprazole 40 mg oral delayed release tablet: orally every other day (27 Jul 2021 07:44)  quinapril 10 mg oral tablet: orally 2 times a day (27 Jul 2021 07:44)  simvastatin 40 mg oral tablet: 1 tab(s) orally once a day (at bedtime) (27 Jul 2021 07:44)    PAST MEDICAL & SURGICAL HISTORY:  S/P CABG x 3  2015    HTN (hypertension)    HLD (hyperlipidemia)    GERD (gastroesophageal reflux disease)    DM (diabetes mellitus)    S/P CABG x 3  2015    H/O hernia repair  x2    History of transurethral prostatectomy    History of surgery on arm  right humerous    H/O elbow surgery  right      FAMILY HISTORY:      SOCIAL HISTORY:        REVIEW OF SYSTEMS:  General:  No wt loss, fevers, chills, night sweats  Eyes:  Good vision, no reported pain  ENT:  No sore throat, pain, runny nose, dysphagia  CV:  No pain, palpitations, hypo/hypertension  Resp:  No dyspnea, cough, tachypnea, wheezing  GI:  No pain, nausea, vomiting, diarrhea, constipation  :  No pain, bleeding, incontinence, nocturia  Muscle:  No pain, weakness  Breast:  No pain, abscess, mass, discharge  Neuro:  No weakness, tingling, memory problems  Psych:  No fatigue, insomnia, mood problems, depression  Endocrine:  No polyuria, polydipsia, cold/heat intolerance  Heme:  No petechiae, ecchymosis, easy bruisability  Skin:  No rash, tattoos, scars, edema    PHYSICAL EXAM:  Vital Signs:  Vital Signs Last 24 Hrs  T(C): 36.8 (31 Jul 2021 13:17), Max: 37.1 (30 Jul 2021 19:46)  T(F): 98.2 (31 Jul 2021 13:17), Max: 98.7 (30 Jul 2021 19:46)  HR: 89 (31 Jul 2021 13:17) (73 - 99)  BP: 150/82 (31 Jul 2021 13:17) (142/92 - 168/118)  RR: 18 (31 Jul 2021 13:17) (17 - 19)  SpO2: 95% (31 Jul 2021 13:17) (95% - 97%)  I&O's Summary    30 Jul 2021 07:01  -  31 Jul 2021 07:00  --------------------------------------------------------  IN: 0 mL / OUT: 800 mL / NET: -800 mL      I&O's Detail    30 Jul 2021 07:01  -  31 Jul 2021 07:00  --------------------------------------------------------  IN:  Total IN: 0 mL    OUT:    Voided (mL): 800 mL  Total OUT: 800 mL    Total NET: -800 mL      Tele:     Constitutional: well developed, normal appearance, well groomed, well nourished, no deformities and no acute distress.   Eyes: the conjunctiva exhibited no abnormalities and the eyelids demonstrated no xanthelasmas.   HEENT: normal oral mucosa, no oral pallor and no oral cyanosis.   Neck: normal jugular venous A waves present, normal jugular venous V waves present and no jugular venous layne A waves.   Pulmonary: no respiratory distress, normal respiratory rhythm and effort, no accessory muscle use and lungs were clear to auscultation bilaterally.   Cardiovascular: heart rate and rhythm were normal, normal S1 and S2 and no murmur, gallop, rub, heave or thrill are present.   Abdomen: soft, non-tender  Musculoskeletal: the gait could not be assessed.   Extremities: no clubbing of the fingernails, no localized cyanosis, no petechial hemorrhages and no ischemic changes.   Skin: normal skin color and pigmentation, no rash, no venous stasis, no skin lesions, no skin ulcer and no xanthoma was observed.   Psychiatric: oriented to person, place, and time, the affect was normal, the mood was normal and not feeling anxious.      LABORATORY:                          11.8   8.81  )-----------( 193      ( 31 Jul 2021 06:01 )             37.2     07-31    141  |  108  |  27<H>  ----------------------------<  216<H>  3.8   |  20<L>  |  1.59<H>    Ca    9.2      31 Jul 2021 06:01      CAPILLARY BLOOD GLUCOSE  POCT Blood Glucose.: 212 mg/dL (31 Jul 2021 11:31)  POCT Blood Glucose.: 223 mg/dL (31 Jul 2021 07:47)  POCT Blood Glucose.: 205 mg/dL (30 Jul 2021 21:45)        IMAGING:  < from: 12 Lead ECG (07.20.21 @ 11:15) >  Normal sinus rhythm  Nonspecific T wave abnormality  Abnormal ECG  No previous ECGs available    < end of copied text >    < from: Xray Kidney Ureter Bladder (07.30.21 @ 15:19) >  1. Moderate retention of colonic stool.  2. Nonspecific transverse colonic ileus.  3. No small bowel gaseous distention.    < end of copied text >      ASSESSMENT:  77 yo M dm, htn, hld, sp laminectomy. ST, PVCs, EF 40%.    PLAN:       acetaminophen   Tablet 975 milliGRAM(s) Oral every 8 hours  aspirin enteric coated 81 milliGRAM(s) Oral daily  insulin lispro (ADMELOG) corrective regimen sliding scale   SubCutaneous three times a day before meals  lisinopril 10 milliGRAM(s) Oral daily  metoclopramide Injectable 10 milliGRAM(s) IV Push every 8 hours  metoprolol succinate ER 25 milliGRAM(s) Oral daily  pantoprazole  Injectable 40 milliGRAM(s) IV Push daily  senna 2 Tablet(s) Oral at bedtime  simethicone 80 milliGRAM(s) Chew three times a day  simvastatin 40 milliGRAM(s) Oral at bedtime      Malcolm Santillan MD, FACC, FASE, FASNC, FACP  Director, Heart Failure Services  [facility]  , Department of Cardiology  [facility]     CHIEF COMPLAINT:  Patient is a 76y old  Male who presents with a chief complaint of L4-5 laminectomy (27 Jul 2021 13:04)      HPI:  75 yo M dm, htn, hld, CABG, sp laminectomy. ST, PVCs, EF 40%? Likely ST/PVC related to slower gut motility, postop ileus and post-surgical state.    ALLERGIES:  No Known Allergies    Home Medications:  Aspirin Enteric Coated 81 mg oral delayed release tablet: 1 tab(s) orally once a day (27 Jul 2021 07:44)  CoQ10: 50 milligram(s) orally once a day (27 Jul 2021 07:44)  folic acid 0.4 mg oral tablet: 1 tab(s) orally once a day (27 Jul 2021 07:44)  glimepiride 4 mg oral tablet: 1  orally 2 times a day (27 Jul 2021 07:44)  Januvia 100 mg oral tablet: 1 tab(s) orally once a day (27 Jul 2021 07:44)  metFORMIN 1000 mg oral tablet: 1 tab(s) orally 2 times a day (27 Jul 2021 07:44)  Metoprolol Succinate ER 25 mg oral tablet, extended release: 1 tab(s) orally once a day (27 Jul 2021 07:44)  Omega-3 1000 mg oral capsule: 1 cap(s) orally 2 times a day (27 Jul 2021 07:44)  pantoprazole 40 mg oral delayed release tablet: orally every other day (27 Jul 2021 07:44)  quinapril 10 mg oral tablet: orally 2 times a day (27 Jul 2021 07:44)  simvastatin 40 mg oral tablet: 1 tab(s) orally once a day (at bedtime) (27 Jul 2021 07:44)    PAST MEDICAL & SURGICAL HISTORY:  S/P CABG x 3  2015    HTN (hypertension)    HLD (hyperlipidemia)    GERD (gastroesophageal reflux disease)    DM (diabetes mellitus)    S/P CABG x 3  2015    H/O hernia repair  x2    History of transurethral prostatectomy    History of surgery on arm  right humerous    H/O elbow surgery  right      FAMILY HISTORY:      SOCIAL HISTORY:        REVIEW OF SYSTEMS:  General:  No wt loss, fevers, chills, night sweats  Eyes:  Good vision, no reported pain  ENT:  No sore throat, pain, runny nose, dysphagia  CV:  No pain, palpitations, hypo/hypertension  Resp:  No dyspnea, cough, tachypnea, wheezing  GI:  No pain, nausea, vomiting, diarrhea, constipation  :  No pain, bleeding, incontinence, nocturia  Muscle:  No pain, weakness  Breast:  No pain, abscess, mass, discharge  Neuro:  No weakness, tingling, memory problems  Psych:  No fatigue, insomnia, mood problems, depression  Endocrine:  No polyuria, polydipsia, cold/heat intolerance  Heme:  No petechiae, ecchymosis, easy bruisability  Skin:  No rash, tattoos, scars, edema    PHYSICAL EXAM:  Vital Signs:  Vital Signs Last 24 Hrs  T(C): 36.8 (31 Jul 2021 13:17), Max: 37.1 (30 Jul 2021 19:46)  T(F): 98.2 (31 Jul 2021 13:17), Max: 98.7 (30 Jul 2021 19:46)  HR: 89 (31 Jul 2021 13:17) (73 - 99)  BP: 150/82 (31 Jul 2021 13:17) (142/92 - 168/118)  RR: 18 (31 Jul 2021 13:17) (17 - 19)  SpO2: 95% (31 Jul 2021 13:17) (95% - 97%)  I&O's Summary    30 Jul 2021 07:01  -  31 Jul 2021 07:00  --------------------------------------------------------  IN: 0 mL / OUT: 800 mL / NET: -800 mL      I&O's Detail    30 Jul 2021 07:01  -  31 Jul 2021 07:00  --------------------------------------------------------  IN:  Total IN: 0 mL    OUT:    Voided (mL): 800 mL  Total OUT: 800 mL    Total NET: -800 mL      Tele: SR, isolated PVCs    Constitutional: well developed, normal appearance, well groomed, well nourished, no deformities and no acute distress.   Eyes: the conjunctiva exhibited no abnormalities and the eyelids demonstrated no xanthelasmas.   HEENT: normal oral mucosa, no oral pallor and no oral cyanosis.   Neck: normal jugular venous A waves present, normal jugular venous V waves present and no jugular venous layne A waves.   Pulmonary: no respiratory distress, normal respiratory rhythm and effort, no accessory muscle use and lungs were clear to auscultation bilaterally.   Cardiovascular: heart rate and rhythm were normal, normal S1 and S2 and no murmur, gallop, rub, heave or thrill are present.   Abdomen: soft, non-tender  Musculoskeletal: the gait could not be assessed.   Extremities: no clubbing of the fingernails, no localized cyanosis, no petechial hemorrhages and no ischemic changes.   Skin: normal skin color and pigmentation, no rash, no venous stasis, no skin lesions, no skin ulcer and no xanthoma was observed.   Psychiatric: oriented to person, place, and time, the affect was normal, the mood was normal and not feeling anxious.      LABORATORY:                          11.8   8.81  )-----------( 193      ( 31 Jul 2021 06:01 )             37.2     07-31    141  |  108  |  27<H>  ----------------------------<  216<H>  3.8   |  20<L>  |  1.59<H>    Ca    9.2      31 Jul 2021 06:01      CAPILLARY BLOOD GLUCOSE  POCT Blood Glucose.: 212 mg/dL (31 Jul 2021 11:31)  POCT Blood Glucose.: 223 mg/dL (31 Jul 2021 07:47)  POCT Blood Glucose.: 205 mg/dL (30 Jul 2021 21:45)        IMAGING:  < from: 12 Lead ECG (07.20.21 @ 11:15) >  Normal sinus rhythm  Nonspecific T wave abnormality  Abnormal ECG  No previous ECGs available    < end of copied text >    < from: Xray Kidney Ureter Bladder (07.30.21 @ 15:19) >  1. Moderate retention of colonic stool.  2. Nonspecific transverse colonic ileus.  3. No small bowel gaseous distention.    < end of copied text >      ASSESSMENT:  75 yo M dm, htn, hld, CABG, sp laminectomy. ST, PVCs, EF 40%? Likely ST/PVC related to slower gut motility, postop ileus and post-surgical state.    PLAN:       acetaminophen   Tablet 975 milliGRAM(s) Oral every 8 hours  aspirin enteric coated 81 milliGRAM(s) Oral daily  insulin lispro (ADMELOG) corrective regimen sliding scale   SubCutaneous three times a day before meals  lisinopril 10 milliGRAM(s) Oral daily  metoclopramide Injectable 10 milliGRAM(s) IV Push every 8 hours  metoprolol succinate ER 25 milliGRAM(s) Oral daily  pantoprazole  Injectable 40 milliGRAM(s) IV Push daily  senna 2 Tablet(s) Oral at bedtime  simethicone 80 milliGRAM(s) Chew three times a day  simvastatin 40 milliGRAM(s) Oral at bedtime    no cardiac contraindication to d/c with outpt followup.  signing off, please call back prn.    Malcolm Santillan MD, FACC, FASJORDAN, FASNC, FACP  Director, Heart Failure Services  [facility]  , Department of Cardiology  [facility]     CHIEF COMPLAINT:  Patient is a 76y old  Male who presents with a chief complaint of L4-5 laminectomy (27 Jul 2021 13:04)      HPI:  77 yo M dm, htn, hld, 3vCABG in 2015, sp laminectomy. ST, PVCs, EF 40%. Likely ST/PVC related to slower gut motility, postop ileus and post-surgical state.    ALLERGIES:  No Known Allergies    Home Medications:  Aspirin Enteric Coated 81 mg oral delayed release tablet: 1 tab(s) orally once a day (27 Jul 2021 07:44)  CoQ10: 50 milligram(s) orally once a day (27 Jul 2021 07:44)  folic acid 0.4 mg oral tablet: 1 tab(s) orally once a day (27 Jul 2021 07:44)  glimepiride 4 mg oral tablet: 1  orally 2 times a day (27 Jul 2021 07:44)  Januvia 100 mg oral tablet: 1 tab(s) orally once a day (27 Jul 2021 07:44)  metFORMIN 1000 mg oral tablet: 1 tab(s) orally 2 times a day (27 Jul 2021 07:44)  Metoprolol Succinate ER 25 mg oral tablet, extended release: 1 tab(s) orally once a day (27 Jul 2021 07:44)  Omega-3 1000 mg oral capsule: 1 cap(s) orally 2 times a day (27 Jul 2021 07:44)  pantoprazole 40 mg oral delayed release tablet: orally every other day (27 Jul 2021 07:44)  quinapril 10 mg oral tablet: orally 2 times a day (27 Jul 2021 07:44)  simvastatin 40 mg oral tablet: 1 tab(s) orally once a day (at bedtime) (27 Jul 2021 07:44)    PAST MEDICAL & SURGICAL HISTORY:  S/P CABG x 3  2015    HTN (hypertension)    HLD (hyperlipidemia)    GERD (gastroesophageal reflux disease)    DM (diabetes mellitus)    S/P CABG x 3  2015    H/O hernia repair  x2    History of transurethral prostatectomy    History of surgery on arm  right humerous    H/O elbow surgery  right      FAMILY HISTORY:  n/a    SOCIAL HISTORY:  former smoker      REVIEW OF SYSTEMS:  General:  No wt loss, fevers, chills, night sweats  Eyes:  Good vision, no reported pain  ENT:  No sore throat, pain, runny nose, dysphagia  CV:  No pain, palpitations, hypo/hypertension  Resp:  No dyspnea, cough, tachypnea, wheezing  GI:  No pain, nausea, vomiting, diarrhea, constipation  :  No pain, bleeding, incontinence, nocturia  Muscle:  No pain, weakness  Breast:  No pain, abscess, mass, discharge  Neuro:  No weakness, tingling, memory problems  Psych:  No fatigue, insomnia, mood problems, depression  Endocrine:  No polyuria, polydipsia, cold/heat intolerance  Heme:  No petechiae, ecchymosis, easy bruisability  Skin:  No rash, tattoos, scars, edema    PHYSICAL EXAM:  Vital Signs:  Vital Signs Last 24 Hrs  T(C): 36.8 (31 Jul 2021 13:17), Max: 37.1 (30 Jul 2021 19:46)  T(F): 98.2 (31 Jul 2021 13:17), Max: 98.7 (30 Jul 2021 19:46)  HR: 89 (31 Jul 2021 13:17) (73 - 99)  BP: 150/82 (31 Jul 2021 13:17) (142/92 - 168/118)  RR: 18 (31 Jul 2021 13:17) (17 - 19)  SpO2: 95% (31 Jul 2021 13:17) (95% - 97%)  I&O's Summary    30 Jul 2021 07:01  -  31 Jul 2021 07:00  --------------------------------------------------------  IN: 0 mL / OUT: 800 mL / NET: -800 mL      I&O's Detail    30 Jul 2021 07:01  -  31 Jul 2021 07:00  --------------------------------------------------------  IN:  Total IN: 0 mL    OUT:    Voided (mL): 800 mL  Total OUT: 800 mL    Total NET: -800 mL      Tele: SR, isolated PVCs    Constitutional: well developed, normal appearance, well groomed, well nourished, no deformities and no acute distress.   Eyes: the conjunctiva exhibited no abnormalities and the eyelids demonstrated no xanthelasmas.   HEENT: normal oral mucosa, no oral pallor and no oral cyanosis.   Neck: normal jugular venous A waves present, normal jugular venous V waves present and no jugular venous layne A waves.   Pulmonary: no respiratory distress, normal respiratory rhythm and effort, no accessory muscle use and lungs were clear to auscultation bilaterally.   Cardiovascular: heart rate and rhythm were normal, normal S1 and S2 and no murmur, gallop, rub, heave or thrill are present.   Abdomen: soft, non-tender  Musculoskeletal: the gait could not be assessed.   Extremities: no clubbing of the fingernails, no localized cyanosis, no petechial hemorrhages and no ischemic changes.   Skin: normal skin color and pigmentation, no rash, no venous stasis, no skin lesions, no skin ulcer and no xanthoma was observed.   Psychiatric: oriented to person, place, and time, the affect was normal, the mood was normal and not feeling anxious.      LABORATORY:                          11.8   8.81  )-----------( 193      ( 31 Jul 2021 06:01 )             37.2     07-31    141  |  108  |  27<H>  ----------------------------<  216<H>  3.8   |  20<L>  |  1.59<H>    Ca    9.2      31 Jul 2021 06:01      CAPILLARY BLOOD GLUCOSE  POCT Blood Glucose.: 212 mg/dL (31 Jul 2021 11:31)  POCT Blood Glucose.: 223 mg/dL (31 Jul 2021 07:47)  POCT Blood Glucose.: 205 mg/dL (30 Jul 2021 21:45)        IMAGING:  < from: 12 Lead ECG (07.20.21 @ 11:15) >  Normal sinus rhythm  Nonspecific T wave abnormality  Abnormal ECG  No previous ECGs available    < end of copied text >    < from: Xray Kidney Ureter Bladder (07.30.21 @ 15:19) >  1. Moderate retention of colonic stool.  2. Nonspecific transverse colonic ileus.  3. No small bowel gaseous distention.    < end of copied text >      ASSESSMENT:  77 yo M dm, htn, hld, 3vCABG in 2015, sp laminectomy. ST, PVCs, EF 40%. Likely ST/PVC related to slower gut motility, postop ileus and post-surgical state.    PLAN:       acetaminophen   Tablet 975 milliGRAM(s) Oral every 8 hours  aspirin enteric coated 81 milliGRAM(s) Oral daily  insulin lispro (ADMELOG) corrective regimen sliding scale   SubCutaneous three times a day before meals  lisinopril 10 milliGRAM(s) Oral daily  metoclopramide Injectable 10 milliGRAM(s) IV Push every 8 hours  metoprolol succinate ER 25 milliGRAM(s) Oral daily  pantoprazole  Injectable 40 milliGRAM(s) IV Push daily  senna 2 Tablet(s) Oral at bedtime  simethicone 80 milliGRAM(s) Chew three times a day  simvastatin 40 milliGRAM(s) Oral at bedtime    HR better today with passing gas/bm.  no cardiac contraindication to d/c with outpt followup.  signing off, please call back prn.    Malcolm Santillan MD, FACC, FASE, FASNC, FACP  Director, Heart Failure Services  [facility]  , Department of Cardiology  [facility]

## 2021-07-31 NOTE — PROGRESS NOTE ADULT - SUBJECTIVE AND OBJECTIVE BOX
SONIYA STALLWORTH is a 76y Male s/p DECOMPRESSION LAMINECTOMY L4-5 WITH IMAGE    EXTENSIVE SURGERY        denies any chest pain shortness of breath palpitation dizziness lightheadedness       T(C): 37.1 (07-31-21 @ 23:35), Max: 37.1 (07-31-21 @ 23:35)  HR: 92 (07-31-21 @ 23:35) (75 - 97)  BP: 168/75 (07-31-21 @ 23:35) (131/84 - 168/75)  RR: 18 (07-31-21 @ 23:35) (16 - 18)  SpO2: 96% (07-31-21 @ 23:35) (94% - 97%)  no jvd/bruit  s1 s2 rrr  cta  s/nt/nd  no calf tend                        11.8   8.81  )-----------( 193      ( 31 Jul 2021 06:01 )             37.2   07-31    141  |  108  |  27<H>  ----------------------------<  216<H>  3.8   |  20<L>  |  1.59<H>    Ca    9.2      31 Jul 2021 06:01      ileus resolving tolerated diet   cont dvt px  pain control  bowel regimen  antiemetics  incentive spirometer

## 2021-08-01 LAB
ANION GAP SERPL CALC-SCNC: 12 MMOL/L — SIGNIFICANT CHANGE UP (ref 5–17)
BUN SERPL-MCNC: 31 MG/DL — HIGH (ref 7–23)
CALCIUM SERPL-MCNC: 9 MG/DL — SIGNIFICANT CHANGE UP (ref 8.5–10.1)
CHLORIDE SERPL-SCNC: 106 MMOL/L — SIGNIFICANT CHANGE UP (ref 96–108)
CO2 SERPL-SCNC: 20 MMOL/L — LOW (ref 22–31)
CREAT SERPL-MCNC: 1.75 MG/DL — HIGH (ref 0.5–1.3)
GLUCOSE BLDC GLUCOMTR-MCNC: 223 MG/DL — HIGH (ref 70–99)
GLUCOSE BLDC GLUCOMTR-MCNC: 262 MG/DL — HIGH (ref 70–99)
GLUCOSE BLDC GLUCOMTR-MCNC: 264 MG/DL — HIGH (ref 70–99)
GLUCOSE BLDC GLUCOMTR-MCNC: 330 MG/DL — HIGH (ref 70–99)
GLUCOSE SERPL-MCNC: 206 MG/DL — HIGH (ref 70–99)
HCT VFR BLD CALC: 35.6 % — LOW (ref 39–50)
HGB BLD-MCNC: 11.4 G/DL — LOW (ref 13–17)
MCHC RBC-ENTMCNC: 27 PG — SIGNIFICANT CHANGE UP (ref 27–34)
MCHC RBC-ENTMCNC: 32 GM/DL — SIGNIFICANT CHANGE UP (ref 32–36)
MCV RBC AUTO: 84.4 FL — SIGNIFICANT CHANGE UP (ref 80–100)
NRBC # BLD: 0 /100 WBCS — SIGNIFICANT CHANGE UP (ref 0–0)
PLATELET # BLD AUTO: 198 K/UL — SIGNIFICANT CHANGE UP (ref 150–400)
POTASSIUM SERPL-MCNC: 3.6 MMOL/L — SIGNIFICANT CHANGE UP (ref 3.5–5.3)
POTASSIUM SERPL-SCNC: 3.6 MMOL/L — SIGNIFICANT CHANGE UP (ref 3.5–5.3)
RBC # BLD: 4.22 M/UL — SIGNIFICANT CHANGE UP (ref 4.2–5.8)
RBC # FLD: 14.4 % — SIGNIFICANT CHANGE UP (ref 10.3–14.5)
SODIUM SERPL-SCNC: 138 MMOL/L — SIGNIFICANT CHANGE UP (ref 135–145)
WBC # BLD: 6.69 K/UL — SIGNIFICANT CHANGE UP (ref 3.8–10.5)
WBC # FLD AUTO: 6.69 K/UL — SIGNIFICANT CHANGE UP (ref 3.8–10.5)

## 2021-08-01 RX ADMIN — Medication 3: at 16:16

## 2021-08-01 RX ADMIN — SIMVASTATIN 40 MILLIGRAM(S): 20 TABLET, FILM COATED ORAL at 21:39

## 2021-08-01 RX ADMIN — Medication 975 MILLIGRAM(S): at 15:00

## 2021-08-01 RX ADMIN — SIMETHICONE 80 MILLIGRAM(S): 80 TABLET, CHEWABLE ORAL at 14:00

## 2021-08-01 RX ADMIN — Medication 4: at 11:21

## 2021-08-01 RX ADMIN — Medication 10 MILLIGRAM(S): at 14:00

## 2021-08-01 RX ADMIN — Medication 81 MILLIGRAM(S): at 11:21

## 2021-08-01 RX ADMIN — SIMETHICONE 80 MILLIGRAM(S): 80 TABLET, CHEWABLE ORAL at 05:23

## 2021-08-01 RX ADMIN — Medication 975 MILLIGRAM(S): at 05:23

## 2021-08-01 RX ADMIN — Medication 975 MILLIGRAM(S): at 22:38

## 2021-08-01 RX ADMIN — PANTOPRAZOLE SODIUM 40 MILLIGRAM(S): 20 TABLET, DELAYED RELEASE ORAL at 11:21

## 2021-08-01 RX ADMIN — Medication 975 MILLIGRAM(S): at 21:39

## 2021-08-01 RX ADMIN — LISINOPRIL 10 MILLIGRAM(S): 2.5 TABLET ORAL at 05:23

## 2021-08-01 RX ADMIN — SIMETHICONE 80 MILLIGRAM(S): 80 TABLET, CHEWABLE ORAL at 21:39

## 2021-08-01 RX ADMIN — Medication 975 MILLIGRAM(S): at 14:00

## 2021-08-01 RX ADMIN — Medication 975 MILLIGRAM(S): at 06:20

## 2021-08-01 RX ADMIN — Medication 25 MILLIGRAM(S): at 05:23

## 2021-08-01 RX ADMIN — Medication 10 MILLIGRAM(S): at 05:24

## 2021-08-01 RX ADMIN — SENNA PLUS 2 TABLET(S): 8.6 TABLET ORAL at 21:39

## 2021-08-01 NOTE — PROGRESS NOTE ADULT - SUBJECTIVE AND OBJECTIVE BOX
SONIYA STALLWORTH is a 76y Male s/p DECOMPRESSION LAMINECTOMY L4-5 WITH IMAGE    EXTENSIVE SURGERY        denies any chest pain shortness of breath palpitation dizziness lightheadedness nausea vomiting fever or chills    T(C): 37.2 (08-01-21 @ 05:20), Max: 37.2 (08-01-21 @ 05:20)  HR: 95 (08-01-21 @ 05:20) (75 - 95)  BP: 153/99 (08-01-21 @ 05:20) (131/84 - 168/75)  RR: 18 (08-01-21 @ 05:20) (16 - 18)  SpO2: 96% (08-01-21 @ 05:20) (94% - 96%)  no jvd/bruit  s1 s2 rrr  cta  s/nt/nd  no calf tend                        11.4   6.69  )-----------( 198      ( 01 Aug 2021 05:45 )             35.6   08-01    138  |  106  |  31<H>  ----------------------------<  206<H>  3.6   |  20<L>  |  1.75<H>    Ca    9.0      01 Aug 2021 05:45        cont dvt px  pain control  bowel regimen  antiemetics  incentive spirometer

## 2021-08-01 NOTE — PROGRESS NOTE ADULT - SUBJECTIVE AND OBJECTIVE BOX
POD#5 S/P Lami L4-L5 with Dural repair  76yMale Patient seen and examined, Pain controlled. Diet advanced yesterday and is tolerating it well. Had a BM yesterday.   Patient Denies SOB, CP, N/V/D   Patient states had BM with AM and Feels Relief       PE: L-Spine: Dressing C/D/I, Sensation/motor intact  Abd: Soft, Non TTP   B/L UE: Skin intact. +ROM shoulder/elbow/wrist/fingers. +ok/thumbsup/fingercross signs.  strength: 5/5.  RP2+ NVI.   B/L LE: Skin intact. +ROM hip/knee/ankle/toes. Ankle Dorsi/plantarflexion: 5/5. Calf: soft, compressible and nontender. DP/PT 2+ NVI                                          11.4   6.69  )-----------( 198      ( 01 Aug 2021 05:45 )             35.6               A: As above   P: Pain Control       DVT Prophylaxis      Incentive spirometry      D/W Dr. [place]       Full diet per gen surgery      PT WBAT      Isometric exercises      Discharge Planning - rehab pending auth      All the above discussed and understood by pt       Ortho to F/U

## 2021-08-02 ENCOUNTER — TRANSCRIPTION ENCOUNTER (OUTPATIENT)
Age: 76
End: 2021-08-02

## 2021-08-02 VITALS
TEMPERATURE: 99 F | SYSTOLIC BLOOD PRESSURE: 148 MMHG | HEART RATE: 84 BPM | OXYGEN SATURATION: 97 % | DIASTOLIC BLOOD PRESSURE: 90 MMHG | RESPIRATION RATE: 18 BRPM

## 2021-08-02 LAB
ANION GAP SERPL CALC-SCNC: 10 MMOL/L — SIGNIFICANT CHANGE UP (ref 5–17)
BUN SERPL-MCNC: 31 MG/DL — HIGH (ref 7–23)
CALCIUM SERPL-MCNC: 9 MG/DL — SIGNIFICANT CHANGE UP (ref 8.5–10.1)
CHLORIDE SERPL-SCNC: 107 MMOL/L — SIGNIFICANT CHANGE UP (ref 96–108)
CO2 SERPL-SCNC: 22 MMOL/L — SIGNIFICANT CHANGE UP (ref 22–31)
CREAT SERPL-MCNC: 1.52 MG/DL — HIGH (ref 0.5–1.3)
GLUCOSE BLDC GLUCOMTR-MCNC: 217 MG/DL — HIGH (ref 70–99)
GLUCOSE BLDC GLUCOMTR-MCNC: 252 MG/DL — HIGH (ref 70–99)
GLUCOSE BLDC GLUCOMTR-MCNC: 365 MG/DL — HIGH (ref 70–99)
GLUCOSE SERPL-MCNC: 305 MG/DL — HIGH (ref 70–99)
POTASSIUM SERPL-MCNC: 3.8 MMOL/L — SIGNIFICANT CHANGE UP (ref 3.5–5.3)
POTASSIUM SERPL-SCNC: 3.8 MMOL/L — SIGNIFICANT CHANGE UP (ref 3.5–5.3)
SODIUM SERPL-SCNC: 139 MMOL/L — SIGNIFICANT CHANGE UP (ref 135–145)

## 2021-08-02 RX ORDER — METFORMIN HYDROCHLORIDE 850 MG/1
1 TABLET ORAL
Qty: 0 | Refills: 0 | DISCHARGE

## 2021-08-02 RX ORDER — UBIDECARENONE 100 MG
50 CAPSULE ORAL
Qty: 0 | Refills: 0 | DISCHARGE

## 2021-08-02 RX ORDER — SENNA PLUS 8.6 MG/1
2 TABLET ORAL
Qty: 0 | Refills: 0 | DISCHARGE
Start: 2021-08-02

## 2021-08-02 RX ORDER — SITAGLIPTIN 50 MG/1
1 TABLET, FILM COATED ORAL
Qty: 0 | Refills: 0 | DISCHARGE

## 2021-08-02 RX ORDER — OXYCODONE HYDROCHLORIDE 5 MG/1
1 TABLET ORAL
Qty: 0 | Refills: 0 | DISCHARGE
Start: 2021-08-02

## 2021-08-02 RX ORDER — MAGNESIUM HYDROXIDE 400 MG/1
30 TABLET, CHEWABLE ORAL DAILY
Refills: 0 | Status: DISCONTINUED | OUTPATIENT
Start: 2021-08-02 | End: 2021-08-02

## 2021-08-02 RX ORDER — ASPIRIN/CALCIUM CARB/MAGNESIUM 324 MG
1 TABLET ORAL
Qty: 0 | Refills: 0 | DISCHARGE

## 2021-08-02 RX ORDER — ACETAMINOPHEN 500 MG
3 TABLET ORAL
Qty: 0 | Refills: 0 | DISCHARGE
Start: 2021-08-02

## 2021-08-02 RX ORDER — ASPIRIN/CALCIUM CARB/MAGNESIUM 324 MG
1 TABLET ORAL
Qty: 0 | Refills: 0 | DISCHARGE
Start: 2021-08-02

## 2021-08-02 RX ORDER — FOLIC ACID 0.8 MG
1 TABLET ORAL
Qty: 0 | Refills: 0 | DISCHARGE

## 2021-08-02 RX ORDER — OMEGA-3 ACID ETHYL ESTERS 1 G
1 CAPSULE ORAL
Qty: 0 | Refills: 0 | DISCHARGE

## 2021-08-02 RX ADMIN — MAGNESIUM HYDROXIDE 30 MILLILITER(S): 400 TABLET, CHEWABLE ORAL at 11:22

## 2021-08-02 RX ADMIN — Medication 5: at 11:22

## 2021-08-02 RX ADMIN — Medication 25 MILLIGRAM(S): at 06:04

## 2021-08-02 RX ADMIN — PANTOPRAZOLE SODIUM 40 MILLIGRAM(S): 20 TABLET, DELAYED RELEASE ORAL at 11:22

## 2021-08-02 RX ADMIN — Medication 975 MILLIGRAM(S): at 14:27

## 2021-08-02 RX ADMIN — Medication 975 MILLIGRAM(S): at 06:03

## 2021-08-02 RX ADMIN — Medication 81 MILLIGRAM(S): at 11:22

## 2021-08-02 RX ADMIN — LISINOPRIL 10 MILLIGRAM(S): 2.5 TABLET ORAL at 06:04

## 2021-08-02 RX ADMIN — Medication 3: at 07:33

## 2021-08-02 RX ADMIN — SIMETHICONE 80 MILLIGRAM(S): 80 TABLET, CHEWABLE ORAL at 06:04

## 2021-08-02 RX ADMIN — SIMETHICONE 80 MILLIGRAM(S): 80 TABLET, CHEWABLE ORAL at 14:29

## 2021-08-02 RX ADMIN — Medication 2: at 16:30

## 2021-08-02 RX ADMIN — Medication 975 MILLIGRAM(S): at 15:20

## 2021-08-02 RX ADMIN — Medication 10 MILLIGRAM(S): at 12:50

## 2021-08-02 RX ADMIN — Medication 975 MILLIGRAM(S): at 07:03

## 2021-08-02 NOTE — PROGRESS NOTE ADULT - SUBJECTIVE AND OBJECTIVE BOX
SONIYA STALLWORTH is a 76y Male s/p DECOMPRESSION LAMINECTOMY L4-5 WITH IMAGE    EXTENSIVE SURGERY        denies any chest pain shortness of breath palpitation dizziness lightheadedness nausea vomiting fever or chills    T(C): 36.7 (08-02-21 @ 08:40), Max: 37.1 (08-01-21 @ 12:39)  HR: 93 (08-02-21 @ 08:40) (80 - 93)  BP: 131/88 (08-02-21 @ 08:40) (118/76 - 172/105)  RR: 16 (08-02-21 @ 08:40) (16 - 18)  SpO2: 96% (08-02-21 @ 08:40) (93% - 96%)  no jvd/bruit  s1 s2 rrr  cta  s/nt/nd  no calf tend                        11.4   6.69  )-----------( 198      ( 01 Aug 2021 05:45 )             35.6   08-02    139  |  107  |  31<H>  ----------------------------<  305<H>  3.8   |  22  |  1.52<H>    Ca    9.0      02 Aug 2021 08:46        cont dvt px  pain control  bowel regimen  antiemetics  incentive spirometer

## 2021-08-02 NOTE — DISCHARGE NOTE NURSING/CASE MANAGEMENT/SOCIAL WORK - NSDCFUADDAPPT_GEN_ALL_CORE_FT
Follow up with your surgeon in two weeks. Call for appointment.  If you need more pain medication, call your surgeon's office. For medication refills or authorizations, please call [phone]  We recommend that you call and schedule a follow up appointment within 2-4 weeks with your primary care physician for repeat blood work (CBC and BMP) for post hospital discharge follow-up care.  Call your surgeon if you have increased redness/pain/drainage or fever. Return to ER for shortness of breath/calf tenderness.

## 2021-08-02 NOTE — PROGRESS NOTE ADULT - NSICDXPILOT_GEN_ALL_CORE
[place]

## 2021-08-02 NOTE — PROGRESS NOTE ADULT - PROVIDER SPECIALTY LIST ADULT
Internal Medicine
Internal Medicine
Orthopedics
Internal Medicine
Internal Medicine
Orthopedics
Surgery
Internal Medicine
Internal Medicine
Orthopedics

## 2021-08-02 NOTE — DISCHARGE NOTE NURSING/CASE MANAGEMENT/SOCIAL WORK - PATIENT PORTAL LINK FT
You can access the FollowMyHealth Patient Portal offered by [facility] by registering at the following website: [email or web address]. By joining [employer]’s FollowMyHealth portal, you will also be able to view your health information using other applications (apps) compatible with our system.

## 2021-08-02 NOTE — PROGRESS NOTE ADULT - SUBJECTIVE AND OBJECTIVE BOX
76yMale s/p Lami L4-5/Dural closure POD#6  Pt seen and examined in NAD.   Pain controlled.  Pt denies any new complaints.   Pt denies CP/SOB/N/V/D/numbness/tingling/bowel or bladder dysfunction.   +BM +Flatus  +Tolerating diet    Vital Signs Last 24 Hrs  T(C): 36.7 (02 Aug 2021 08:40), Max: 37.1 (01 Aug 2021 12:39)  T(F): 98 (02 Aug 2021 08:40), Max: 98.8 (01 Aug 2021 12:39)  HR: 93 (02 Aug 2021 08:40) (80 - 93)  BP: 131/88 (02 Aug 2021 08:40) (118/76 - 172/105)  BP(mean): --  RR: 16 (02 Aug 2021 08:40) (16 - 18)  SpO2: 96% (02 Aug 2021 08:40) (93% - 96%)    PE:   General: A&Ox3, NAD  Spine: Dressing c/d/i   Abd: NT/ND  B/L UE: Skin intact. +ROM shoulder/elbow/wrist/fingers. +ok/thumbsup/fingercross signs.  strength: 5/5.  RP2+ NVI.   B/L LE: Skin intact. +ROM hip/knee/ankle/toes. Ankle Dorsi/plantarflexion: 5/5. Calf: soft, compressible and nontender. DP/PT 2+ NVI.                             11.4   6.69  )-----------( 198      ( 01 Aug 2021 05:45 )             35.6       08-02    139  |  107  |  31<H>  ----------------------------<  305<H>  3.8   |  22  |  1.52<H>    Ca    9.0      02 Aug 2021 08:46          A/P: 76yMale s/p Lami L4-5/Dural closure POD#6  Post op illeus resolved  Gen surg consult appreciated  Cardio: Tachycardia/PVS 2/2 post op illeus. F/u outpatient. no acute intervention at this time  Wound care  Pain controlled  PT: WBAT: Spinal precautions  Isometric exercises  DVT ppx: SCDs/Aspirin  Incentive spirometry counseled   Discharge: planning   All the above discussed and understood by pt

## 2021-08-06 DIAGNOSIS — R00.0 TACHYCARDIA, UNSPECIFIED: ICD-10-CM

## 2021-08-06 DIAGNOSIS — I49.3 VENTRICULAR PREMATURE DEPOLARIZATION: ICD-10-CM

## 2021-08-06 DIAGNOSIS — M51.26 OTHER INTERVERTEBRAL DISC DISPLACEMENT, LUMBAR REGION: ICD-10-CM
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
## 2021-08-06 DIAGNOSIS — Z79.82 LONG TERM (CURRENT) USE OF ASPIRIN: ICD-10-CM

## 2021-08-06 DIAGNOSIS — M48.061 SPINAL STENOSIS, LUMBAR REGION WITHOUT NEUROGENIC CLAUDICATION: ICD-10-CM

## 2021-08-06 DIAGNOSIS — N18.9 CHRONIC KIDNEY DISEASE, UNSPECIFIED: ICD-10-CM

## 2021-08-06 DIAGNOSIS — Z72.0 TOBACCO USE: ICD-10-CM

## 2021-08-06 DIAGNOSIS — K56.7 ILEUS, UNSPECIFIED: ICD-10-CM

## 2021-08-06 DIAGNOSIS — M62.830 MUSCLE SPASM OF BACK: ICD-10-CM
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
## 2021-08-06 DIAGNOSIS — Z79.84 LONG TERM (CURRENT) USE OF ORAL HYPOGLYCEMIC DRUGS: ICD-10-CM

## 2021-08-06 DIAGNOSIS — E78.5 HYPERLIPIDEMIA, UNSPECIFIED: ICD-10-CM

## 2021-08-06 DIAGNOSIS — I25.10 ATHEROSCLEROTIC HEART DISEASE OF NATIVE CORONARY ARTERY WITHOUT ANGINA PECTORIS: ICD-10-CM

## 2021-08-06 DIAGNOSIS — G97.41 ACCIDENTAL PUNCTURE OR LACERATION OF DURA DURING A PROCEDURE: ICD-10-CM

## 2021-08-06 DIAGNOSIS — I12.9 HYPERTENSIVE CHRONIC KIDNEY DISEASE WITH STAGE 1 THROUGH STAGE 4 CHRONIC KIDNEY DISEASE, OR UNSPECIFIED CHRONIC KIDNEY DISEASE: ICD-10-CM

## 2021-08-06 DIAGNOSIS — K21.9 GASTRO-ESOPHAGEAL REFLUX DISEASE WITHOUT ESOPHAGITIS: ICD-10-CM

## 2021-08-06 DIAGNOSIS — M40.209 UNSPECIFIED KYPHOSIS, SITE UNSPECIFIED: ICD-10-CM

## 2021-08-06 DIAGNOSIS — E11.22 TYPE 2 DIABETES MELLITUS WITH DIABETIC CHRONIC KIDNEY DISEASE: ICD-10-CM

## 2021-08-06 DIAGNOSIS — Z95.1 PRESENCE OF AORTOCORONARY BYPASS GRAFT: ICD-10-CM

## 2022-03-26 PROBLEM — I10 ESSENTIAL (PRIMARY) HYPERTENSION: Chronic | Status: ACTIVE | Noted: 2021-07-20

## 2022-03-26 PROBLEM — Z95.1 PRESENCE OF AORTOCORONARY BYPASS GRAFT: Chronic | Status: ACTIVE | Noted: 2021-07-20

## 2022-03-26 PROBLEM — K21.9 GASTRO-ESOPHAGEAL REFLUX DISEASE WITHOUT ESOPHAGITIS: Chronic | Status: ACTIVE | Noted: 2021-07-20

## 2022-03-26 PROBLEM — E11.9 TYPE 2 DIABETES MELLITUS WITHOUT COMPLICATIONS: Chronic | Status: ACTIVE | Noted: 2021-07-20

## 2022-03-26 PROBLEM — E78.5 HYPERLIPIDEMIA, UNSPECIFIED: Chronic | Status: ACTIVE | Noted: 2021-07-20

## 2022-04-18 ENCOUNTER — APPOINTMENT (OUTPATIENT)
Dept: ORTHOPEDIC SURGERY | Facility: CLINIC | Age: 77
End: 2022-04-18
Payer: MEDICARE

## 2022-04-18 VITALS — HEIGHT: 68 IN | BODY MASS INDEX: 28.04 KG/M2 | WEIGHT: 185 LBS

## 2022-04-18 DIAGNOSIS — I10 ESSENTIAL (PRIMARY) HYPERTENSION: ICD-10-CM

## 2022-04-18 DIAGNOSIS — M54.17 RADICULOPATHY, LUMBOSACRAL REGION: ICD-10-CM

## 2022-04-18 DIAGNOSIS — E78.00 PURE HYPERCHOLESTEROLEMIA, UNSPECIFIED: ICD-10-CM

## 2022-04-18 DIAGNOSIS — I51.9 HEART DISEASE, UNSPECIFIED: ICD-10-CM

## 2022-04-18 DIAGNOSIS — M48.00 SPINAL STENOSIS, SITE UNSPECIFIED: ICD-10-CM

## 2022-04-18 DIAGNOSIS — E11.9 TYPE 2 DIABETES MELLITUS W/OUT COMPLICATIONS: ICD-10-CM

## 2022-04-18 PROCEDURE — 72100 X-RAY EXAM L-S SPINE 2/3 VWS: CPT

## 2022-04-18 PROCEDURE — 72170 X-RAY EXAM OF PELVIS: CPT

## 2022-04-18 PROCEDURE — 99214 OFFICE O/P EST MOD 30 MIN: CPT

## 2022-04-18 NOTE — HISTORY OF PRESENT ILLNESS
[Lower back] : lower back [8] : 8 [0] : 0 [Dull/Aching] : dull/aching [Frequent] : frequent [Household chores] : household chores [Leisure] : leisure [Sleep] : sleep [Social interactions] : social interactions [Rest] : rest [Sitting] : sitting [Standing] : standing [Walking] : walking [Bending forward] : bending forward [Extending back] : extending back [Exercising] : exercising [Stairs] : stairs [de-identified] : 6/18/21 - Patient presents for FUV regarding return of low back and right leg pain. Started to experience new pain radiating down the left leg last week. Pain is constant, present when standing, walking, seated, and laying down. Patient's walking tolerance is becoming more limited due to pain. \par \par 3/25/2021 - FUV after L4-L5 LESI on 3/11/2021. He reports 50-70% reduction of his low back and right leg pain. Focus of pain remains in the right buttock. Feels his pain is tolerable at present. \par \par 3/12/20 - Patient presents for initial evaluation. He c/o low back and right leg pain for a few months. Has had similar episode of pain a few years ago, which responded well to OZZIE's at [facility]. Distributes pain 60% right leg and 40% low back pain. Focus of pain in right buttock. Subjective weakness in right leg. Associated numbness in right toes. Went to chiropractic with temporary relief. No PT. \par \par Injections: \par 1) Right L4-L5, L5-S1 TFESI (6/24/2020, 7/29/2020)\par 2) Right PM L4-L5 LESI (9/30/2020, 3/11/2021)\par \par Pertinent Surgical History: N/A\par \par Imaging:\par MRI Lumbar Spine (3/5/2020) - O/C\par \par L1-L2: There is no posterior disc herniation.\par L2-L3: There is no posterior disc herniation.\par L3-L4: There is no posterior disc herniation.\par L4-L5: There is a broad-based posterior disc herniation, asymmetric on the right with mild central stenosis and impingement upon right greater than left L5 nerve roots with right greater than left foraminal narrowing.\par L5-S1: There is diffuse disc bulging and broad-based posterior disc herniation with bilateral facet arthrosis, mild central stenosis and impingement upon bilateral S1 nerve roots left greater than right with left greater than right neural foraminal narrowing. 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.\par \par Physician Disclaimer: I have personally reviewed and confirmed all HPI data with the patient.\par \par 6/30/21: Here as a NC from Dr Reynolds - see his notes above - started out of the blue with pain in the lower back and into the lower back - right leg worse than the leg - back pain worse than the legs \par \par Has had multiple LESI - diminishing returns\par has tried PT/chiro/multiple medications - currently on gabapentin without relief \par No cane/brace \par \par xrays today:\par l spine - \par AP PELVIS-\par \par DM/HTN/HLD/CARDIAC BYPASS\par diverticulitis \par No hx of cancer \par No loss of bb control \par \par retired\par \par 7/12/21: Here for fu - plan at last was "reviewed the imaging and the case - needs an updated MRi and then to follow up to the plan the surgery - has tried extensive conservative" - overall the pain continues in the lower back and mostly in the rigght leg \par \par MRI L spine- 1. Diffuse loss of disc signal with loss of disc height at L5-S1. Lowest disc level sacral disc remnant with facet\par arthrosis.\par 2. L1-L2: Bulge and facet arthrosis.\par 3. L2-L3: Bulge and facet arthrosis.\par 4. L3-L4: Bulge and facet arthrosis.\par 5. L4-L5: Minor retrolisthesis. Bulge and facet arthrosis with facet effusions. Inferior foraminal stenosis, left\par greater than right. Central and asymmetric to right herniation with caudal migration posterior to L5 and small\par additional extruded component. There is moderate canal stenosis.\par 6. L5-S1: Minor retrolisthesis. Broad bulge and facet and ligamentum flavum hypertrophy with foraminal\par stenosis. Broad herniation impressing on the thecal sac with lateral mass effect on the thecal sac by the facet\par arthrosis and mild-to-moderate central stenosis.\par 7. renal cyst\par \par hes aware of the renal cyst\par \par 8/20/2021: Pt here s/p L4 and L5 decompression (DOS:7/27/2021) Pt states his radicular symptoms have significantly improved but have not resolved (left sided symptoms have resolved however). Pt denies headaches or visual changes s/p dural tear. Pt denies weakness or b/b dysfunction.\par \par 9/17/21: Here for fu - plan at last was "Pt will continue home physical therapy 3 x's /week x 6 weeks. \par RTO in 4 weeks for repeat clinical examination" -overall doing slightly better - using walker - a bit on the crooked - not using much pain medication - no headaches \par \par xray today:\par L spine- asymmetric take off to the side\par \par 9/24/21: hRE FOR FU - PLAN AT LAST WAS "pt" -HAD DRAINAGE from the back - over the past few days \par no fevers/chills\par no photophobia \par no positional headaches\par \par 9/27/21: Here to follow up. No fevers, chills - had aspiration done few days ago and had very slight bloody drainage afterwards - no HAs,photophobia - overall level of pain about the same \par \par MRi L spine - fluid collection noted - the disc and the bones without signs of infection\par \par 10/4/21: Here for fu - mild swelling at the back - no drainage - going to PT \par \par lab work with mild elevation of inflammatory markers\par \par 10/11/21: Here to follow up. Plan at last visit was" aspirated about 10 ccs off the back today - primarily serosangenous - refilled the abx - reviewed the mRi and doesn’t dangerous nor signs of disc or osteo - suspect this is a case of a post of seroma or lower grade infection that hopefully we can treat without needing to return to the OR - explained time will tell - fu in 1 week"\par \par no drainage in the meantime\par \par on the Bactrim\par \par 10/18/21: Here to follow up. Plan at last visit "aspirated about 5ccs off the back today - primarily serosangenous - refilled the abx - reviewed the mRi and doesn’t dangerous nor signs of disc or osteo - suspect this is a case of a post of seroma or lower grade infection that hopefully we can treat without needing to return to the OR - explained time will tell - fu in 1 week" Pain is better today with no radicular symptoms. No fever, chills\par \par 11/1/21: Here to follow up. Plan at last visit was "asp attempted with 3cc only. doing well. follow up in 2 weeks" Overall radic symptoms have resolved, no swelling. no fevers, chills\par \par 11/29/21: here to the lower back fu - no drainage - pain in the lower back with prolonged standing - using walker \par \par having some HAs now that are worse with laying down and no photophobia\par \par interval diagnoses of achilles tendon\par \par 1/17/22: Here for fu - plan at last was "recoving from surgery - unfortunately now with achilles issues and in a boot - remains with scoliosis deformtiy - the radiculopathy has passed - the headaches hes experiencing are not consistent with CSF leak and the would appears benign and isnt painful so I think that hes out of the woods regarding infection in the lumbar - fu in about 4 weeks" - having problems with staying upright for long - not having the sciatica \par \par xrays today:\par L spine - lumbar scoli unchanged \par AP PELVIS - no obvious fracture\par \par 04/18/2022 Follow up for l spine - plan at last was 'xrays today:\par L spine - lumbar scoli unchanged \par AP PELVIS - no obvious fracture" - overall doing about the same - using cane/walker - lower back hurts - cant walk for long - cane \par \par xrays today:\par L spine - lumbar scoli unchanged \par AP PELvis-  no obvious fracture \par \par  [] : no [FreeTextEntry5] : Physical therapy\par  [de-identified] : Physical therapy\par

## 2022-04-18 NOTE — REASON FOR VISIT
[FreeTextEntry2] : 4/18/22: Here to f/u. Plan last visit was "suspect hes deconditioned - rec PT - No change in the xrays- fu 3 months." -

## 2022-08-15 ENCOUNTER — APPOINTMENT (OUTPATIENT)
Dept: ORTHOPEDIC SURGERY | Facility: CLINIC | Age: 77
End: 2022-08-15

## 2022-09-09 ENCOUNTER — APPOINTMENT (OUTPATIENT)
Dept: ORTHOPEDIC SURGERY | Facility: CLINIC | Age: 77
End: 2022-09-09

## 2022-09-09 VITALS — HEIGHT: 68 IN | WEIGHT: 185 LBS | BODY MASS INDEX: 28.04 KG/M2

## 2022-09-09 DIAGNOSIS — M51.36 OTHER INTERVERTEBRAL DISC DEGENERATION, LUMBAR REGION: ICD-10-CM

## 2022-09-09 DIAGNOSIS — M25.78 OTHER INTERVERTEBRAL DISC DEGENERATION, LUMBAR REGION: ICD-10-CM

## 2022-09-09 DIAGNOSIS — Z98.890 OTHER SPECIFIED POSTPROCEDURAL STATES: ICD-10-CM

## 2022-09-09 DIAGNOSIS — M51.27 OTHER INTERVERTEBRAL DISC DISPLACEMENT, LUMBOSACRAL REGION: ICD-10-CM

## 2022-09-09 PROCEDURE — 99214 OFFICE O/P EST MOD 30 MIN: CPT

## 2022-09-09 NOTE — HISTORY OF PRESENT ILLNESS
[8] : 8 [3] : 3 [Standing] : standing [Walking] : walking [] : yes [de-identified] : 6/18/21 - Patient presents for FUV regarding return of low back and right leg pain. Started to experience new pain radiating down the left leg last week. Pain is constant, present when standing, walking, seated, and laying down. Patient's walking tolerance is becoming more limited due to pain. \par \par 3/25/2021 - FUV after L4-L5 LESI on 3/11/2021. He reports 50-70% reduction of his low back and right leg pain. Focus of pain remains in the right buttock. Feels his pain is tolerable at present. \par \par 3/12/20 - Patient presents for initial evaluation. He c/o low back and right leg pain for a few months. Has had similar episode of pain a few years ago, which responded well to [facility] at [place]. Distributes pain 60% right leg and 40% low back pain. Focus of pain in right buttock. Subjective weakness in right leg. Associated numbness in right toes. Went to chiropractic with temporary relief. No PT. \par \par Injections: \par 1) Right L4-L5, L5-S1 TFESI (6/24/2020, 7/29/2020)\par 2) Right PM L4-L5 LESI (9/30/2020, 3/11/2021)\par \par Pertinent Surgical History: N/A\par \par Imaging:\par MRI Lumbar Spine (3/5/2020) - O/C\par \par L1-L2: There is no posterior disc herniation.\par L2-L3: There is no posterior disc herniation.\par L3-L4: There is no posterior disc herniation.\par L4-L5: There is a broad-based posterior disc herniation, asymmetric on the right with mild central stenosis and impingement upon right greater than left L5 nerve roots with right greater than left foraminal narrowing.\par L5-S1: There is diffuse disc bulging and broad-based posterior disc herniation with bilateral facet arthrosis, mild central stenosis and impingement upon bilateral S1 nerve roots left greater than right with left greater than right neural foraminal narrowing. 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.\par \par Physician Disclaimer: I have personally reviewed and confirmed all HPI data with the patient.\par \par 6/30/21: Here as a NC from Dr Reynolds - see his notes above - started out of the blue with pain in the lower back and into the lower back - right leg worse than the leg - back pain worse than the legs \par \par Has had multiple LESI - diminishing returns\par has tried PT/chiro/multiple medications - currently on gabapentin without relief \par No cane/brace \par \par xrays today:\par l spine - \par AP PELVIS-\par \par DM/HTN/HLD/CARDIAC BYPASS\par diverticulitis \par No hx of cancer \par No loss of bb control \par \par retired\par \par 7/12/21: Here for fu - plan at last was "reviewed the imaging and the case - needs an updated MRi and then to follow up to the plan the surgery - has tried extensive conservative" - overall the pain continues in the lower back and mostly in the rigght leg \par \par MRI L spine- 1. Diffuse loss of disc signal with loss of disc height at L5-S1. Lowest disc level sacral disc remnant with facet\par arthrosis.\par 2. L1-L2: Bulge and facet arthrosis.\par 3. L2-L3: Bulge and facet arthrosis.\par 4. L3-L4: Bulge and facet arthrosis.\par 5. L4-L5: Minor retrolisthesis. Bulge and facet arthrosis with facet effusions. Inferior foraminal stenosis, left\par greater than right. Central and asymmetric to right herniation with caudal migration posterior to L5 and small\par additional extruded component. There is moderate canal stenosis.\par 6. L5-S1: Minor retrolisthesis. Broad bulge and facet and ligamentum flavum hypertrophy with foraminal\par stenosis. Broad herniation impressing on the thecal sac with lateral mass effect on the thecal sac by the facet\par arthrosis and mild-to-moderate central stenosis.\par 7. renal cyst\par \par hes aware of the renal cyst\par \par 8/20/2021: Pt here s/p L4 and L5 decompression (DOS:7/27/2021) Pt states his radicular symptoms have significantly improved but have not resolved (left sided symptoms have resolved however). Pt denies headaches or visual changes s/p dural tear. Pt denies weakness or b/b dysfunction.\par \par 9/17/21: Here for fu - plan at last was "Pt will continue home physical therapy 3 x's /week x 6 weeks. \par RTO in 4 weeks for repeat clinical examination" -overall doing slightly better - using walker - a bit on the crooked - not using much pain medication - no headaches \par \par xray today:\par L spine- asymmetric take off to the side\par \par 9/24/21: hRE FOR FU - PLAN AT LAST WAS "pt" -HAD DRAINAGE from the back - over the past few days \par no fevers/chills\par no photophobia \par no positional headaches\par \par 9/27/21: Here to follow up. No fevers, chills - had aspiration done few days ago and had very slight bloody drainage afterwards - no HAs,photophobia - overall level of pain about the same \par \par MRi L spine - fluid collection noted - the disc and the bones without signs of infection\par \par 10/4/21: Here for fu - mild swelling at the back - no drainage - going to PT \par \par lab work with mild elevation of inflammatory markers\par \par 10/11/21: Here to follow up. Plan at last visit was" aspirated about 10 ccs off the back today - primarily serosangenous - refilled the abx - reviewed the mRi and doesn’t dangerous nor signs of disc or osteo - suspect this is a case of a post of seroma or lower grade infection that hopefully we can treat without needing to return to the OR - explained time will tell - fu in 1 week"\par \par no drainage in the meantime\par \par on the Bactrim\par \par 10/18/21: Here to follow up. Plan at last visit "aspirated about 5ccs off the back today - primarily serosangenous - refilled the abx - reviewed the mRi and doesn’t dangerous nor signs of disc or osteo - suspect this is a case of a post of seroma or lower grade infection that hopefully we can treat without needing to return to the OR - explained time will tell - fu in 1 week" Pain is better today with no radicular symptoms. No fever, chills\par \par 11/1/21: Here to follow up. Plan at last visit was "asp attempted with 3cc only. doing well. follow up in 2 weeks" Overall radic symptoms have resolved, no swelling. no fevers, chills\par \par 11/29/21: here to the lower back fu - no drainage - pain in the lower back with prolonged standing - using walker \par \par having some HAs now that are worse with laying down and no photophobia\par \par interval diagnoses of achilles tendon\par \par 1/17/22: Here for fu - plan at last was "recoving from surgery - unfortunately now with achilles issues and in a boot - remains with scoliosis deformtiy - the radiculopathy has passed - the headaches hes experiencing are not consistent with CSF leak and the would appears benign and isnt painful so I think that hes out of the woods regarding infection in the lumbar - fu in about 4 weeks" - having problems with staying upright for long - not having the sciatica \par \par xrays today:\par L spine - lumbar scoli unchanged \par AP PELVIS - no obvious fracture\par \par 04/18/2022 Follow up for l spine - plan at last was 'xrays today:\par L spine - lumbar scoli unchanged \par AP PELVIS - no obvious fracture" - overall doing about the same - using cane/walker - lower back hurts - cant walk for long - cane \par \par xrays today:\par L spine - lumbar scoli unchanged \par AP PELvis-  no obvious fracture \par \par 9/9/22: Here for f/up Lspine. Making slow improvements with PT. Having good and bad days for pain.  Not taking any medications for pain\par  [FreeTextEntry1] : back [FreeTextEntry5] : Pt is here to follow up for his back [de-identified] : motion [de-identified] : pt

## 2022-09-09 NOTE — DISCUSSION/SUMMARY
[de-identified] : reviewed the case with him - don’t rec further surgery \par PT \par fu 3-4 months

## 2022-11-07 NOTE — CONSULT NOTE ADULT - CONSULT REQUESTED DATE/TIME
29-Jul-2021 10:09
Procedure ended, pt tolerated with sedation  Vss, 3 cores taken with 18x10 [employer]  Sent to lab  dstat to needle tract  Pt back to [facility] in stable condition for 4 hour recovery 
30-Jul-2021 17:24
27-Jul-2021 21:47
31-Jul-2021 15:06

## 2023-02-15 ENCOUNTER — APPOINTMENT (OUTPATIENT)
Dept: ORTHOPEDIC SURGERY | Facility: CLINIC | Age: 78
End: 2023-02-15
Payer: MEDICARE

## 2023-02-15 VITALS — HEIGHT: 68 IN | BODY MASS INDEX: 28.04 KG/M2 | WEIGHT: 185 LBS

## 2023-02-15 DIAGNOSIS — M47.816 SPONDYLOSIS W/OUT MYELOPATHY OR RADICULOPATHY, LUMBAR REGION: ICD-10-CM

## 2023-02-15 DIAGNOSIS — M54.16 RADICULOPATHY, LUMBAR REGION: ICD-10-CM

## 2023-02-15 DIAGNOSIS — M48.062 SPINAL STENOSIS, LUMBAR REGION WITH NEUROGENIC CLAUDICATION: ICD-10-CM

## 2023-02-15 PROCEDURE — 72100 X-RAY EXAM L-S SPINE 2/3 VWS: CPT

## 2023-02-15 PROCEDURE — 99214 OFFICE O/P EST MOD 30 MIN: CPT

## 2023-02-15 NOTE — DISCUSSION/SUMMARY
[de-identified] : reviewed the case with him - has inflammaed something - no clear findings on the xrays\par PT \par fu 3-4 months \par \par wouldn’t rec MDP 2/2 DM\par WOUNDnt rec NSAIDS 2/2 asa/HTn\par \par GIVE IT SOME TIME \par if not getting better  can call and get set up for a lumbar MRi \par \par cont with walker

## 2023-02-15 NOTE — HISTORY OF PRESENT ILLNESS
[8] : 8 [3] : 3 [Intermittent] : intermittent [Household chores] : household chores [Standing] : standing [Walking] : walking [] : yes [de-identified] : 6/18/21 - Patient presents for FUV regarding return of low back and right leg pain. Started to experience new pain radiating down the left leg last week. Pain is constant, present when standing, walking, seated, and laying down. Patient's walking tolerance is becoming more limited due to pain. \par \par 3/25/2021 - FUV after L4-L5 LESI on 3/11/2021. He reports 50-70% reduction of his low back and right leg pain. Focus of pain remains in the right buttock. Feels his pain is tolerable at present. \par \par 3/12/20 - Patient presents for initial evaluation. He c/o low back and right leg pain for a few months. Has had similar episode of pain a few years ago, which responded well to [facility] at [facility]. Distributes pain 60% right leg and 40% low back pain. Focus of pain in right buttock. Subjective weakness in right leg. Associated numbness in right toes. Went to chiropractic with temporary relief. No PT. \par \par Injections: \par 1) Right L4-L5, L5-S1 TFESI (6/24/2020, 7/29/2020)\par 2) Right PM L4-L5 LESI (9/30/2020, 3/11/2021)\par \par Pertinent Surgical History: N/A\par \par Imaging:\par MRI Lumbar Spine (3/5/2020) - O/C\par \par L1-L2: There is no posterior disc herniation.\par L2-L3: There is no posterior disc herniation.\par L3-L4: There is no posterior disc herniation.\par L4-L5: There is a broad-based posterior disc herniation, asymmetric on the right with mild central stenosis and impingement upon right greater than left L5 nerve roots with right greater than left foraminal narrowing.\par L5-S1: There is diffuse disc bulging and broad-based posterior disc herniation with bilateral facet arthrosis, mild central stenosis and impingement upon bilateral S1 nerve roots left greater than right with left greater than right neural foraminal narrowing. 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.\par \par Physician Disclaimer: I have personally reviewed and confirmed all HPI data with the patient.\par \par 6/30/21: Here as a NC from Dr Reynolds - see his notes above - started out of the blue with pain in the lower back and into the lower back - right leg worse than the leg - back pain worse than the legs \par \par Has had multiple LESI - diminishing returns\par has tried PT/chiro/multiple medications - currently on gabapentin without relief \par No cane/brace \par \par xrays today:\par l spine - \par AP PELVIS-\par \par DM/HTN/HLD/CARDIAC BYPASS\par diverticulitis \par No hx of cancer \par No loss of bb control \par \par retired\par \par 7/12/21: Here for fu - plan at last was "reviewed the imaging and the case - needs an updated MRi and then to follow up to the plan the surgery - has tried extensive conservative" - overall the pain continues in the lower back and mostly in the rigght leg \par \par MRI L spine- 1. Diffuse loss of disc signal with loss of disc height at L5-S1. Lowest disc level sacral disc remnant with facet\par arthrosis.\par 2. L1-L2: Bulge and facet arthrosis.\par 3. L2-L3: Bulge and facet arthrosis.\par 4. L3-L4: Bulge and facet arthrosis.\par 5. L4-L5: Minor retrolisthesis. Bulge and facet arthrosis with facet effusions. Inferior foraminal stenosis, left\par greater than right. Central and asymmetric to right herniation with caudal migration posterior to L5 and small\par additional extruded component. There is moderate canal stenosis.\par 6. L5-S1: Minor retrolisthesis. Broad bulge and facet and ligamentum flavum hypertrophy with foraminal\par stenosis. Broad herniation impressing on the thecal sac with lateral mass effect on the thecal sac by the facet\par arthrosis and mild-to-moderate central stenosis.\par 7. renal cyst\par \par hes aware of the renal cyst\par \par 8/20/2021: Pt here s/p L4 and L5 decompression (DOS:7/27/2021) Pt states his radicular symptoms have significantly improved but have not resolved (left sided symptoms have resolved however). Pt denies headaches or visual changes s/p dural tear. Pt denies weakness or b/b dysfunction.\par \par 9/17/21: Here for fu - plan at last was "Pt will continue home physical therapy 3 x's /week x 6 weeks. \par RTO in 4 weeks for repeat clinical examination" -overall doing slightly better - using walker - a bit on the crooked - not using much pain medication - no headaches \par \par xray today:\par L spine- asymmetric take off to the side\par \par 9/24/21: hRE FOR FU - PLAN AT LAST WAS "pt" -HAD DRAINAGE from the back - over the past few days \par no fevers/chills\par no photophobia \par no positional headaches\par \par 9/27/21: Here to follow up. No fevers, chills - had aspiration done few days ago and had very slight bloody drainage afterwards - no HAs,photophobia - overall level of pain about the same \par \par MRi L spine - fluid collection noted - the disc and the bones without signs of infection\par \par 10/4/21: Here for fu - mild swelling at the back - no drainage - going to PT \par \par lab work with mild elevation of inflammatory markers\par \par 10/11/21: Here to follow up. Plan at last visit was" aspirated about 10 ccs off the back today - primarily serosangenous - refilled the abx - reviewed the mRi and doesn’t dangerous nor signs of disc or osteo - suspect this is a case of a post of seroma or lower grade infection that hopefully we can treat without needing to return to the OR - explained time will tell - fu in 1 week"\par \par no drainage in the meantime\par \par on the Bactrim\par \par 10/18/21: Here to follow up. Plan at last visit "aspirated about 5ccs off the back today - primarily serosangenous - refilled the abx - reviewed the mRi and doesn’t dangerous nor signs of disc or osteo - suspect this is a case of a post of seroma or lower grade infection that hopefully we can treat without needing to return to the OR - explained time will tell - fu in 1 week" Pain is better today with no radicular symptoms. No fever, chills\par \par 11/1/21: Here to follow up. Plan at last visit was "asp attempted with 3cc only. doing well. follow up in 2 weeks" Overall radic symptoms have resolved, no swelling. no fevers, chills\par \par 11/29/21: here to the lower back fu - no drainage - pain in the lower back with prolonged standing - using walker \par \par having some HAs now that are worse with laying down and no photophobia\par \par interval diagnoses of achilles tendon\par \par 1/17/22: Here for fu - plan at last was "recoving from surgery - unfortunately now with achilles issues and in a boot - remains with scoliosis deformtiy - the radiculopathy has passed - the headaches hes experiencing are not consistent with CSF leak and the would appears benign and isnt painful so I think that hes out of the woods regarding infection in the lumbar - fu in about 4 weeks" - having problems with staying upright for long - not having the sciatica \par \par xrays today:\par L spine - lumbar scoli unchanged \par AP PELVIS - no obvious fracture\par \par 04/18/2022 Follow up for l spine - plan at last was 'xrays today:\par L spine - lumbar scoli unchanged \par AP PELVIS - no obvious fracture" - overall doing about the same - using cane/walker - lower back hurts - cant walk for long - cane \par \par xrays today:\par L spine - lumbar scoli unchanged \par AP PELvis-  no obvious fracture \par \par 9/9/22: Here for f/up Lspine. Making slow improvements with PT. Having good and bad days for pain.  Not taking any medications for pain\par \par 02/15/23 here for a follow up on the lower spine ,cont physical therapy ,having more pain for the past week  - pain in the posterior hips and in the lower back - not down the legs - using walker \par \par had an MRi of the pancrease last week and the pain started after that \par \par has been goinG TO pt \par \par xrays today:\par l spine - \par  [FreeTextEntry1] : back [de-identified] : motion [FreeTextEntry5] : Pt is here to follow up for his back [de-identified] : pt

## 2023-05-12 ENCOUNTER — APPOINTMENT (OUTPATIENT)
Dept: ORTHOPEDIC SURGERY | Facility: CLINIC | Age: 78
End: 2023-05-12

## 2025-07-01 NOTE — ASU PATIENT PROFILE, ADULT - SURGICAL SITE INCISION
Airway  Date/Time: 7/1/2025 8:41 AM  Reason: elective    Airway not difficult    Staffing  Performed: JOSEPH   Authorized by: Jimmy Wolf MD    Performed by: Katie Blackburn  Patient location during procedure: OR    Patient Condition  Indications for airway management: anesthesia  Patient position: sniffing  MILS maintained throughout  Planned trial extubation  Sedation level: deep     Final Airway Details   Preoxygenated: yes  Final airway type: endotracheal airway  Successful airway: ETT  Cuffed: yes   Successful intubation technique: direct laryngoscopy  Adjuncts used in placement: intubating stylet and cricoid pressure  Endotracheal tube insertion site: oral  Blade: Gabriel  Blade size: #4  ETT size (mm): 7.5  Cormack-Lehane Classification: grade IIa - partial view of glottis  Placement verified by: chest auscultation and capnometry   Measured from: lips  ETT to lips (cm): 23  Number of attempts at approach: 1  Number of other approaches attempted: 0    Additional Comments  Lips and teeth in pre anesthetic condition         no